# Patient Record
Sex: MALE | Race: BLACK OR AFRICAN AMERICAN | NOT HISPANIC OR LATINO | Employment: STUDENT | URBAN - METROPOLITAN AREA
[De-identification: names, ages, dates, MRNs, and addresses within clinical notes are randomized per-mention and may not be internally consistent; named-entity substitution may affect disease eponyms.]

---
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
## 2022-01-25 ENCOUNTER — OFFICE VISIT (OUTPATIENT)
Dept: PRIMARY CARE CLINIC | Facility: CLINIC | Age: 22
End: 2022-01-25
Payer: COMMERCIAL

## 2022-01-25 VITALS
HEIGHT: 69 IN | SYSTOLIC BLOOD PRESSURE: 126 MMHG | OXYGEN SATURATION: 98 % | WEIGHT: 167.31 LBS | RESPIRATION RATE: 16 BRPM | BODY MASS INDEX: 24.78 KG/M2 | TEMPERATURE: 98 F | HEART RATE: 93 BPM | DIASTOLIC BLOOD PRESSURE: 80 MMHG

## 2022-01-25 DIAGNOSIS — Z00.00 ANNUAL PHYSICAL EXAM: Primary | ICD-10-CM

## 2022-01-25 DIAGNOSIS — L20.82 FLEXURAL ECZEMA: ICD-10-CM

## 2022-01-25 DIAGNOSIS — Z77.21 EXPOSURE TO BLOOD OR BODY FLUID: ICD-10-CM

## 2022-01-25 DIAGNOSIS — Z91.010 ALLERGY TO PEANUTS: ICD-10-CM

## 2022-01-25 DIAGNOSIS — J45.20 MILD INTERMITTENT ASTHMA WITHOUT COMPLICATION: ICD-10-CM

## 2022-01-25 PROCEDURE — 3079F DIAST BP 80-89 MM HG: CPT | Mod: CPTII,S$GLB,, | Performed by: FAMILY MEDICINE

## 2022-01-25 PROCEDURE — 1159F MED LIST DOCD IN RCRD: CPT | Mod: CPTII,S$GLB,, | Performed by: FAMILY MEDICINE

## 2022-01-25 PROCEDURE — 3074F SYST BP LT 130 MM HG: CPT | Mod: CPTII,S$GLB,, | Performed by: FAMILY MEDICINE

## 2022-01-25 PROCEDURE — 3074F PR MOST RECENT SYSTOLIC BLOOD PRESSURE < 130 MM HG: ICD-10-PCS | Mod: CPTII,S$GLB,, | Performed by: FAMILY MEDICINE

## 2022-01-25 PROCEDURE — 87591 N.GONORRHOEAE DNA AMP PROB: CPT | Performed by: FAMILY MEDICINE

## 2022-01-25 PROCEDURE — 3079F PR MOST RECENT DIASTOLIC BLOOD PRESSURE 80-89 MM HG: ICD-10-PCS | Mod: CPTII,S$GLB,, | Performed by: FAMILY MEDICINE

## 2022-01-25 PROCEDURE — 87491 CHLMYD TRACH DNA AMP PROBE: CPT | Performed by: FAMILY MEDICINE

## 2022-01-25 PROCEDURE — 99385 PR PREVENTIVE VISIT,NEW,18-39: ICD-10-PCS | Mod: S$GLB,,, | Performed by: FAMILY MEDICINE

## 2022-01-25 PROCEDURE — 99999 PR PBB SHADOW E&M-EST. PATIENT-LVL V: ICD-10-PCS | Mod: PBBFAC,,, | Performed by: FAMILY MEDICINE

## 2022-01-25 PROCEDURE — 99999 PR PBB SHADOW E&M-EST. PATIENT-LVL V: CPT | Mod: PBBFAC,,, | Performed by: FAMILY MEDICINE

## 2022-01-25 PROCEDURE — 3008F PR BODY MASS INDEX (BMI) DOCUMENTED: ICD-10-PCS | Mod: CPTII,S$GLB,, | Performed by: FAMILY MEDICINE

## 2022-01-25 PROCEDURE — 1160F PR REVIEW ALL MEDS BY PRESCRIBER/CLIN PHARMACIST DOCUMENTED: ICD-10-PCS | Mod: CPTII,S$GLB,, | Performed by: FAMILY MEDICINE

## 2022-01-25 PROCEDURE — 3008F BODY MASS INDEX DOCD: CPT | Mod: CPTII,S$GLB,, | Performed by: FAMILY MEDICINE

## 2022-01-25 PROCEDURE — 1160F RVW MEDS BY RX/DR IN RCRD: CPT | Mod: CPTII,S$GLB,, | Performed by: FAMILY MEDICINE

## 2022-01-25 PROCEDURE — 1159F PR MEDICATION LIST DOCUMENTED IN MEDICAL RECORD: ICD-10-PCS | Mod: CPTII,S$GLB,, | Performed by: FAMILY MEDICINE

## 2022-01-25 PROCEDURE — 99385 PREV VISIT NEW AGE 18-39: CPT | Mod: S$GLB,,, | Performed by: FAMILY MEDICINE

## 2022-01-25 NOTE — PROGRESS NOTES
[facility] Note    Chief Complaint      Chief Complaint   Patient presents with    Establish Care       History of Present Illness      Rob Villanueva is a 21 y.o. male with chronic conditions of eczema, asthma who presents today for:    Interested in testing for STD - 1 week bumps on penis.  No other symptoms, no new partner  Wants Testing for allergies/asthma    Immunizations:   COVID- boosted 12/21   Flu- due   Pneumonia- due    Activity - plays basketball, school at [employer] - eggs and chicken breakfast.  Ponderosa and protein shake for lunch.  Meatballs, chicken and rice.   No soda, 8 bottles.     No tob  occ alcohol      Patient Care Team:  Linda Sanchez MD as PCP - General (Internal Medicine)     Health Maintenance:  Immunization History   Administered Date(s) Administered    COVID-19, MRNA, LN-S, PF (MODERNA FULL 0.5 ML DOSE) 03/20/2021, 04/17/2021    DTaP 2002, 2002, 01/06/2003, 12/18/2003, 08/15/2006    HIB 2002, 2002, 01/16/2003, 12/18/2003    HPV Quadrivalent 08/05/2013, 04/17/2014, 07/14/2015    Hepatitis A, Pediatric/Adolescent, 2 Dose 08/02/2016, 09/12/2017    Hepatitis B 2002    Hepatitis B, Pediatric/Adolescent 2002, 2002, 01/16/2003    IPV 2002, 2002, 12/18/2003, 08/15/2006    Influenza 10/08/2007, 09/24/2009, 11/02/2010    Influenza - Quadrivalent - PF *Preferred* (6 months and older) 01/25/2022    MMR 05/30/2003, 08/15/2006    Meningococcal B, OMV 06/08/2021, 07/02/2021    Meningococcal Conjugate 08/05/2013    Meningococcal Conjugate (MCV4P) 08/05/2013, 08/06/2018    PPD Test 05/17/2016    Pneumococcal Conjugate - 7 Valent 2002, 2002, 01/16/2003, 05/30/2003    Tdap 08/05/2013    Varicella 05/30/2003, 09/24/2009      Health Maintenance   Topic Date Due    Hepatitis C Screening  Never done    TETANUS VACCINE  08/05/2023    Lipid Panel  Completed    HPV Vaccines  Completed        Past  Medical History:  Past Medical History:   Diagnosis Date    Asthma     Eczema        Past Surgical History:   has no past surgical history on file.    Family History:  family history includes Allergies in his father; Asthma in his father; Cancer in his paternal grandfather; Diabetes in his paternal grandfather and paternal grandmother; Hearing loss in his father; Heart disease in his paternal grandfather; Hyperlipidemia in his paternal grandfather; Hypertension in his paternal grandfather; Obesity in his mother; Transient ischemic attack in his father.     Social History:  Social History     Tobacco Use    Smoking status: Never Smoker    Smokeless tobacco: Never Used   Substance Use Topics    Alcohol use: Yes     Comment: SPECIAL OCCASIONS    Drug use: Not Currently       Review of Systems   Constitutional: Negative for fever.   Respiratory: Negative for shortness of breath.    Cardiovascular: Negative for chest pain.   Gastrointestinal: Negative for change in bowel habit and change in bowel habit.   Genitourinary: Negative for bladder incontinence.        Medications:    Current Outpatient Medications:     albuterol (PROVENTIL/VENTOLIN HFA) 90 mcg/actuation inhaler, Rescue, Disp: 18 g, Rfl: 3    calcium carbonate-vitamin D3 600 mg(1,500mg) -800 unit Tab, Take 1 tablet by mouth 2 (two) times daily., Disp: 60 tablet, Rfl: 11    fluticasone (VERAMYST) 27.5 mcg/actuation nasal spray, 1 spray by Nasal route once daily., Disp: 10 g, Rfl: 11    hydrOXYzine HCl (ATARAX) 25 MG tablet, Take 1 tablet (25 mg total) by mouth nightly as needed for Itching., Disp: 60 tablet, Rfl: 3    inhalation device (AEROCHAMBER PLUS FLOW-VU), Use as directed for inhalation., Disp: 1 Device, Rfl: 0    mometasone 0.1% (ELOCON) 0.1 % cream, Apply to affected area daily, Disp: 50 g, Rfl: 1    ondansetron (ZOFRAN) 8 MG tablet, Take 1 tablet (8 mg total) by mouth every 8 (eight) hours as needed for Nausea., Disp: 4 tablet, Rfl: 0     "EPINEPHrine (EPIPEN) 0.3 mg/0.3 mL AtIn, Inject 0.3 mLs (0.3 mg total) into the muscle once. for 1 dose, Disp: 2 each, Rfl: 0     Allergies:  Review of patient's allergies indicates:   Allergen Reactions    Fish containing products     Peanut     Soy     Milk containing products Rash       Physical Exam      Vital Signs  Temp: 98.2 °F (36.8 °C)  Pulse: 93  Resp: 16  SpO2: 98 %  BP: 126/80  BP Location: Left arm  Pain Score: 0-No pain  Height and Weight  Height: 5' 8.5" (174 cm)  Weight: 75.9 kg (167 lb 5.3 oz)  BSA (Calculated - sq m): 1.92 sq meters  BMI (Calculated): 25.1  Weight in (lb) to have BMI = 25: 166.5             Physical Exam  Constitutional:       General: He is not in acute distress.     Appearance: Normal appearance.   HENT:      Right Ear: Tympanic membrane, ear canal and external ear normal.      Left Ear: Tympanic membrane, ear canal and external ear normal.      Nose: Nose normal.      Mouth/Throat:      Mouth: Mucous membranes are moist.      Pharynx: Oropharynx is clear. No oropharyngeal exudate or posterior oropharyngeal erythema.   Eyes:      Extraocular Movements: Extraocular movements intact.      Conjunctiva/sclera: Conjunctivae normal.      Pupils: Pupils are equal, round, and reactive to light.   Cardiovascular:      Rate and Rhythm: Normal rate and regular rhythm.      Pulses: Normal pulses.      Heart sounds: No murmur heard.  No friction rub. No gallop.    Pulmonary:      Effort: Pulmonary effort is normal.      Breath sounds: No wheezing, rhonchi or rales.   Abdominal:      General: Abdomen is flat. Bowel sounds are normal. There is no distension.      Palpations: Abdomen is soft. There is no mass.      Tenderness: There is no abdominal tenderness.   Musculoskeletal:      Cervical back: Neck supple.      Right lower leg: No edema.      Left lower leg: No edema.   Lymphadenopathy:      Cervical: No cervical adenopathy.   Skin:     General: Skin is warm and dry.      Comments: Very " fine papule around corona, no erythema, no ulcer   Neurological:      General: No focal deficit present.      Mental Status: He is alert.   Psychiatric:         Mood and Affect: Mood normal.         Behavior: Behavior normal.          Laboratory:  CBC:  Recent Labs   Lab 08/10/20  0909   Hemoglobin 17.2       CMP:            URINALYSIS:         LIPIDS:  Recent Labs   Lab 08/10/20  0909   HDL 49   Cholesterol 185   Triglycerides 68   LDL Cholesterol 122.4   HDL/Cholesterol Ratio 26.5   Non-HDL Cholesterol 136   Total Cholesterol/HDL Ratio 3.8       TSH:        A1C:        Urine Microalbumin/Cr:          Other:   Recent Labs   Lab 08/10/20  0909   Vit D, 25-Hydroxy 13 L           Assessment/Plan     Rob Villanueva is a 21 y.o.male with:    Annual physical exam  -     CBC Auto Differential; Future; Expected date: 01/25/2022  -     Comprehensive Metabolic Panel; Future; Expected date: 01/25/2022  -     Lipid Panel; Future; Expected date: 01/25/2022  -     HIV 1/2 Ag/Ab (4th Gen); Future; Expected date: 01/25/2022  Physical activity, diet, healthy lifestyle, alcohol, tobacco, screenings and immunizations discussed.    Mild intermittent asthma without complication  -     Complete PFT with bronchodilator; Future  -     Hemoglobin; Future; Expected date: 01/25/2022  -     Ambulatory referral/consult to Allergy; Future; Expected date: 02/01/2022  - stable, continue current medication  Get PFT to stage him as he would like dupixent  Exposure to blood or body fluid  -     RPR; Future; Expected date: 01/25/2022  -     HSV 1 & 2, IgG; Future; Expected date: 01/25/2022  -     Hepatitis Panel, Acute; Future; Expected date: 01/25/2022  -     C. trachomatis/N. gonorrhoeae by AMP DNA [facility]; Urine    Allergy to peanuts  -     Ambulatory referral/consult to Allergy; Future; Expected date: 02/01/2022    Flexural eczema  -     Ambulatory referral/consult to Allergy; Future; Expected date: 02/01/2022  Use aquaphor on penile rash        Chronic conditions status updated as per HPI.  Other than changes above, cont current medications and maintain follow up with specialists.  Return to clinic in Follow up in about 1 year (around 1/25/2023).      Linda Sanchez MD  [facility]

## 2022-01-27 LAB
C TRACH DNA SPEC QL NAA+PROBE: NOT DETECTED
N GONORRHOEA DNA SPEC QL NAA+PROBE: NOT DETECTED

## 2022-01-31 ENCOUNTER — OFFICE VISIT (OUTPATIENT)
Dept: INTERNAL MEDICINE | Facility: CLINIC | Age: 22
End: 2022-01-31
Payer: COMMERCIAL

## 2022-01-31 ENCOUNTER — HOSPITAL ENCOUNTER (OUTPATIENT)
Dept: CARDIOLOGY | Facility: HOSPITAL | Age: 22
Discharge: HOME | End: 2022-01-31
Payer: COMMERCIAL

## 2022-01-31 ENCOUNTER — LAB VISIT (OUTPATIENT)
Dept: LAB | Facility: HOSPITAL | Age: 22
End: 2022-01-31
Attending: ALLERGY & IMMUNOLOGY
Payer: COMMERCIAL

## 2022-01-31 ENCOUNTER — PATIENT MESSAGE (OUTPATIENT)
Dept: INTERNAL MEDICINE | Facility: CLINIC | Age: 22
End: 2022-01-31

## 2022-01-31 ENCOUNTER — OFFICE VISIT (OUTPATIENT)
Dept: ALLERGY | Facility: CLINIC | Age: 22
End: 2022-01-31
Payer: COMMERCIAL

## 2022-01-31 VITALS
BODY MASS INDEX: 25.73 KG/M2 | SYSTOLIC BLOOD PRESSURE: 136 MMHG | WEIGHT: 169.75 LBS | TEMPERATURE: 99 F | HEIGHT: 68 IN | HEART RATE: 72 BPM | DIASTOLIC BLOOD PRESSURE: 76 MMHG

## 2022-01-31 VITALS
RESPIRATION RATE: 18 BRPM | HEART RATE: 80 BPM | OXYGEN SATURATION: 98 % | HEIGHT: 68 IN | BODY MASS INDEX: 25.81 KG/M2 | SYSTOLIC BLOOD PRESSURE: 110 MMHG | DIASTOLIC BLOOD PRESSURE: 60 MMHG

## 2022-01-31 DIAGNOSIS — Z77.21 EXPOSURE TO BLOOD OR BODY FLUID: ICD-10-CM

## 2022-01-31 DIAGNOSIS — J30.1 SEASONAL ALLERGIC RHINITIS DUE TO POLLEN: ICD-10-CM

## 2022-01-31 DIAGNOSIS — Z91.018 FOOD ALLERGY: ICD-10-CM

## 2022-01-31 DIAGNOSIS — Z91.010 ALLERGY TO PEANUTS: ICD-10-CM

## 2022-01-31 DIAGNOSIS — Z00.00 ANNUAL PHYSICAL EXAM: ICD-10-CM

## 2022-01-31 DIAGNOSIS — Z91.018 FOOD ALLERGY: Primary | ICD-10-CM

## 2022-01-31 DIAGNOSIS — L20.82 FLEXURAL ECZEMA: ICD-10-CM

## 2022-01-31 DIAGNOSIS — F41.0 PANIC ATTACK: ICD-10-CM

## 2022-01-31 DIAGNOSIS — I49.9 IRREGULAR HEART BEAT: Primary | ICD-10-CM

## 2022-01-31 DIAGNOSIS — J45.20 MILD INTERMITTENT ASTHMA WITHOUT COMPLICATION: ICD-10-CM

## 2022-01-31 DIAGNOSIS — U07.1 COVID: ICD-10-CM

## 2022-01-31 DIAGNOSIS — R07.9 CHEST PAIN, UNSPECIFIED TYPE: ICD-10-CM

## 2022-01-31 DIAGNOSIS — I49.9 IRREGULAR HEART BEAT: ICD-10-CM

## 2022-01-31 LAB
ALBUMIN SERPL BCP-MCNC: 4.8 G/DL (ref 3.5–5.2)
ALP SERPL-CCNC: 65 U/L (ref 55–135)
ALT SERPL W/O P-5'-P-CCNC: 26 U/L (ref 10–44)
ANION GAP SERPL CALC-SCNC: 12 MMOL/L (ref 8–16)
AST SERPL-CCNC: 20 U/L (ref 10–40)
BASOPHILS # BLD AUTO: 0.04 K/UL (ref 0–0.2)
BASOPHILS NFR BLD: 0.6 % (ref 0–1.9)
BILIRUB SERPL-MCNC: 0.7 MG/DL (ref 0.1–1)
BUN SERPL-MCNC: 13 MG/DL (ref 6–20)
CALCIUM SERPL-MCNC: 10.1 MG/DL (ref 8.7–10.5)
CHLORIDE SERPL-SCNC: 104 MMOL/L (ref 95–110)
CHOLEST SERPL-MCNC: 197 MG/DL (ref 120–199)
CHOLEST/HDLC SERPL: 3.9 {RATIO} (ref 2–5)
CO2 SERPL-SCNC: 24 MMOL/L (ref 23–29)
CREAT SERPL-MCNC: 0.9 MG/DL (ref 0.5–1.4)
CTP QC/QA: YES
DIFFERENTIAL METHOD: ABNORMAL
EOSINOPHIL # BLD AUTO: 0.2 K/UL (ref 0–0.5)
EOSINOPHIL NFR BLD: 2.4 % (ref 0–8)
ERYTHROCYTE [DISTWIDTH] IN BLOOD BY AUTOMATED COUNT: 12.8 % (ref 11.5–14.5)
EST. GFR  (AFRICAN AMERICAN): >60 ML/MIN/1.73 M^2
EST. GFR  (NON AFRICAN AMERICAN): >60 ML/MIN/1.73 M^2
GLUCOSE SERPL-MCNC: 81 MG/DL (ref 70–110)
HCT VFR BLD AUTO: 54.4 % (ref 40–54)
HDLC SERPL-MCNC: 50 MG/DL (ref 40–75)
HDLC SERPL: 25.4 % (ref 20–50)
HGB BLD-MCNC: 17.4 G/DL (ref 14–18)
HGB BLD-MCNC: 17.4 G/DL (ref 14–18)
IMM GRANULOCYTES # BLD AUTO: 0.01 K/UL (ref 0–0.04)
IMM GRANULOCYTES NFR BLD AUTO: 0.1 % (ref 0–0.5)
LDLC SERPL CALC-MCNC: 136.2 MG/DL (ref 63–159)
LYMPHOCYTES # BLD AUTO: 2.1 K/UL (ref 1–4.8)
LYMPHOCYTES NFR BLD: 30 % (ref 18–48)
MCH RBC QN AUTO: 30.5 PG (ref 27–31)
MCHC RBC AUTO-ENTMCNC: 32 G/DL (ref 32–36)
MCV RBC AUTO: 95 FL (ref 82–98)
MONOCYTES # BLD AUTO: 0.4 K/UL (ref 0.3–1)
MONOCYTES NFR BLD: 5.4 % (ref 4–15)
NEUTROPHILS # BLD AUTO: 4.4 K/UL (ref 1.8–7.7)
NEUTROPHILS NFR BLD: 61.5 % (ref 38–73)
NONHDLC SERPL-MCNC: 147 MG/DL
NRBC BLD-RTO: 0 /100 WBC
PLATELET # BLD AUTO: 225 K/UL (ref 150–450)
PMV BLD AUTO: 10.8 FL (ref 9.2–12.9)
POTASSIUM SERPL-SCNC: 4 MMOL/L (ref 3.5–5.1)
PROT SERPL-MCNC: 8.6 G/DL (ref 6–8.4)
RBC # BLD AUTO: 5.71 M/UL (ref 4.6–6.2)
SARS-COV-2 RDRP RESP QL NAA+PROBE: POSITIVE
SODIUM SERPL-SCNC: 140 MMOL/L (ref 136–145)
TRIGL SERPL-MCNC: 54 MG/DL (ref 30–150)
WBC # BLD AUTO: 7.1 K/UL (ref 3.9–12.7)

## 2022-01-31 PROCEDURE — 86695 HERPES SIMPLEX TYPE 1 TEST: CPT | Performed by: FAMILY MEDICINE

## 2022-01-31 PROCEDURE — 99213 PR OFFICE/OUTPT VISIT, EST, LEVL III, 20-29 MIN: ICD-10-PCS | Mod: S$GLB,,, | Performed by: PHYSICIAN ASSISTANT

## 2022-01-31 PROCEDURE — 80053 COMPREHEN METABOLIC PANEL: CPT | Performed by: FAMILY MEDICINE

## 2022-01-31 PROCEDURE — 3008F PR BODY MASS INDEX (BMI) DOCUMENTED: ICD-10-PCS | Mod: CPTII,S$GLB,, | Performed by: PHYSICIAN ASSISTANT

## 2022-01-31 PROCEDURE — 99213 OFFICE O/P EST LOW 20 MIN: CPT | Mod: S$GLB,,, | Performed by: PHYSICIAN ASSISTANT

## 2022-01-31 PROCEDURE — U0002: ICD-10-PCS | Mod: QW,S$GLB,, | Performed by: PHYSICIAN ASSISTANT

## 2022-01-31 PROCEDURE — 80074 ACUTE HEPATITIS PANEL: CPT | Performed by: FAMILY MEDICINE

## 2022-01-31 PROCEDURE — 3078F PR MOST RECENT DIASTOLIC BLOOD PRESSURE < 80 MM HG: ICD-10-PCS | Mod: CPTII,S$GLB,, | Performed by: ALLERGY & IMMUNOLOGY

## 2022-01-31 PROCEDURE — 87389 HIV-1 AG W/HIV-1&-2 AB AG IA: CPT | Performed by: FAMILY MEDICINE

## 2022-01-31 PROCEDURE — 3078F DIAST BP <80 MM HG: CPT | Mod: CPTII,S$GLB,, | Performed by: ALLERGY & IMMUNOLOGY

## 2022-01-31 PROCEDURE — 99204 PR OFFICE/OUTPT VISIT, NEW, LEVL IV, 45-59 MIN: ICD-10-PCS | Mod: S$GLB,,, | Performed by: ALLERGY & IMMUNOLOGY

## 2022-01-31 PROCEDURE — 99999 PR PBB SHADOW E&M-EST. PATIENT-LVL IV: CPT | Mod: PBBFAC,,, | Performed by: ALLERGY & IMMUNOLOGY

## 2022-01-31 PROCEDURE — 80061 LIPID PANEL: CPT | Performed by: FAMILY MEDICINE

## 2022-01-31 PROCEDURE — 93010 ELECTROCARDIOGRAM REPORT: CPT | Mod: ,,, | Performed by: INTERNAL MEDICINE

## 2022-01-31 PROCEDURE — 86696 HERPES SIMPLEX TYPE 2 TEST: CPT | Performed by: FAMILY MEDICINE

## 2022-01-31 PROCEDURE — 86003 ALLG SPEC IGE CRUDE XTRC EA: CPT | Performed by: ALLERGY & IMMUNOLOGY

## 2022-01-31 PROCEDURE — 86008 ALLG SPEC IGE RECOMB EA: CPT | Mod: 59 | Performed by: ALLERGY & IMMUNOLOGY

## 2022-01-31 PROCEDURE — 99999 PR PBB SHADOW E&M-EST. PATIENT-LVL III: ICD-10-PCS | Mod: PBBFAC,,, | Performed by: PHYSICIAN ASSISTANT

## 2022-01-31 PROCEDURE — 3074F PR MOST RECENT SYSTOLIC BLOOD PRESSURE < 130 MM HG: ICD-10-PCS | Mod: CPTII,S$GLB,, | Performed by: PHYSICIAN ASSISTANT

## 2022-01-31 PROCEDURE — 1159F MED LIST DOCD IN RCRD: CPT | Mod: CPTII,S$GLB,, | Performed by: PHYSICIAN ASSISTANT

## 2022-01-31 PROCEDURE — 93010 EKG 12-LEAD: ICD-10-PCS | Mod: ,,, | Performed by: INTERNAL MEDICINE

## 2022-01-31 PROCEDURE — 86003 ALLG SPEC IGE CRUDE XTRC EA: CPT | Mod: 59 | Performed by: ALLERGY & IMMUNOLOGY

## 2022-01-31 PROCEDURE — 85025 COMPLETE CBC W/AUTO DIFF WBC: CPT | Performed by: FAMILY MEDICINE

## 2022-01-31 PROCEDURE — 86592 SYPHILIS TEST NON-TREP QUAL: CPT | Performed by: FAMILY MEDICINE

## 2022-01-31 PROCEDURE — 36415 COLL VENOUS BLD VENIPUNCTURE: CPT | Performed by: FAMILY MEDICINE

## 2022-01-31 PROCEDURE — 3008F BODY MASS INDEX DOCD: CPT | Mod: CPTII,S$GLB,, | Performed by: ALLERGY & IMMUNOLOGY

## 2022-01-31 PROCEDURE — 3078F PR MOST RECENT DIASTOLIC BLOOD PRESSURE < 80 MM HG: ICD-10-PCS | Mod: CPTII,S$GLB,, | Performed by: PHYSICIAN ASSISTANT

## 2022-01-31 PROCEDURE — 3008F PR BODY MASS INDEX (BMI) DOCUMENTED: ICD-10-PCS | Mod: CPTII,S$GLB,, | Performed by: ALLERGY & IMMUNOLOGY

## 2022-01-31 PROCEDURE — 1159F PR MEDICATION LIST DOCUMENTED IN MEDICAL RECORD: ICD-10-PCS | Mod: CPTII,S$GLB,, | Performed by: PHYSICIAN ASSISTANT

## 2022-01-31 PROCEDURE — 3075F PR MOST RECENT SYSTOLIC BLOOD PRESS GE 130-139MM HG: ICD-10-PCS | Mod: CPTII,S$GLB,, | Performed by: ALLERGY & IMMUNOLOGY

## 2022-01-31 PROCEDURE — 99204 OFFICE O/P NEW MOD 45 MIN: CPT | Mod: S$GLB,,, | Performed by: ALLERGY & IMMUNOLOGY

## 2022-01-31 PROCEDURE — 3075F SYST BP GE 130 - 139MM HG: CPT | Mod: CPTII,S$GLB,, | Performed by: ALLERGY & IMMUNOLOGY

## 2022-01-31 PROCEDURE — 99999 PR PBB SHADOW E&M-EST. PATIENT-LVL III: CPT | Mod: PBBFAC,,, | Performed by: PHYSICIAN ASSISTANT

## 2022-01-31 PROCEDURE — 99999 PR PBB SHADOW E&M-EST. PATIENT-LVL IV: ICD-10-PCS | Mod: PBBFAC,,, | Performed by: ALLERGY & IMMUNOLOGY

## 2022-01-31 PROCEDURE — 3078F DIAST BP <80 MM HG: CPT | Mod: CPTII,S$GLB,, | Performed by: PHYSICIAN ASSISTANT

## 2022-01-31 PROCEDURE — 3074F SYST BP LT 130 MM HG: CPT | Mod: CPTII,S$GLB,, | Performed by: PHYSICIAN ASSISTANT

## 2022-01-31 PROCEDURE — U0002 COVID-19 LAB TEST NON-CDC: HCPCS | Mod: QW,S$GLB,, | Performed by: PHYSICIAN ASSISTANT

## 2022-01-31 PROCEDURE — 3008F BODY MASS INDEX DOCD: CPT | Mod: CPTII,S$GLB,, | Performed by: PHYSICIAN ASSISTANT

## 2022-01-31 PROCEDURE — 93005 ELECTROCARDIOGRAM TRACING: CPT

## 2022-01-31 RX ORDER — MONTELUKAST SODIUM 10 MG/1
10 TABLET ORAL NIGHTLY
Qty: 30 TABLET | Refills: 0 | Status: SHIPPED | OUTPATIENT
Start: 2022-01-31 | End: 2022-01-31

## 2022-01-31 RX ORDER — FLUTICASONE FUROATE, UMECLIDINIUM BROMIDE AND VILANTEROL TRIFENATATE 200; 62.5; 25 UG/1; UG/1; UG/1
1 POWDER RESPIRATORY (INHALATION) DAILY
Qty: 1 EACH | Refills: 3 | Status: SHIPPED | OUTPATIENT
Start: 2022-01-31 | End: 2023-01-09

## 2022-01-31 NOTE — PROGRESS NOTES
Subjective:      Patient ID: Rob Villanueva is a 21 y.o. male.    Chief Complaint: Irregular Heart Beat    HPI  Here today for a follow up after he had a rapid response after a blood draw about 30 minutes ago.   After blood draw today developed nausea and vomiting. Then developed shortness of breath and tightness in his chest after standing up. Symptoms started 30-40 minutes ago and have resolved. Feels 90% improved. Still feeling a slight tightness in the chest. Denies any lightheadedness/dizziness, shortness of breath, or palpitations.   Rapid response was called. Initially heart rate was low at 46, bp 110/60 and oxygen 93. Pt currently at 99% O2 sat and pulse 80 but still irregular. Has not had issues with irregular heart beat in the past. Pt states that before the blood draw he felt 100% without any issues, no chest pain/tightness or shortness of breath. He does have rhinorrhea but is seeing allergist for this. He was completing blood work this morning ordered by his allergist.   Hates getting his blood drawn and gets anxiety with it. Hasn't had an episode like this though after a blood draw in the past.    Denies taking any over the counter supplements or stimulants. Denies drinking excess caffeine this morning.     -pt able to ambulate without assistance. Normal coordination and gait. No dizziness/lightheadedness or changes in vision.   Patient Active Problem List   Diagnosis    Eczema    Allergy to peanuts    Asthma, mild intermittent    Allergic rhinoconjunctivitis    Vitamin D insufficiency       Current Outpatient Medications:     albuterol (PROVENTIL/VENTOLIN HFA) 90 mcg/actuation inhaler, Rescue, Disp: 18 g, Rfl: 3    fluticasone (VERAMYST) 27.5 mcg/actuation nasal spray, 1 spray by Nasal route once daily., Disp: 10 g, Rfl: 11    fluticasone-umeclidin-vilanter (TRELEGY ELLIPTA) 200-62.5-25 mcg inhaler, Inhale 1 puff into the lungs once daily., Disp: 1 each, Rfl: 3    EPINEPHrine (EPIPEN) 0.3  "mg/0.3 mL AtIn, Inject 0.3 mLs (0.3 mg total) into the muscle once. for 1 dose, Disp: 2 each, Rfl: 0    montelukast (SINGULAIR) 10 mg tablet, TAKE 1 TABLET(10 MG) BY MOUTH EVERY EVENING, Disp: 90 tablet, Rfl: 1      Review of Systems   Constitutional: Negative for activity change, appetite change, chills, diaphoresis, fatigue, fever and unexpected weight change.   HENT: Positive for rhinorrhea. Negative for congestion, hearing loss, postnasal drip, sore throat, trouble swallowing and voice change.    Eyes: Negative.  Negative for visual disturbance.   Respiratory: Positive for chest tightness. Negative for apnea, cough, choking, shortness of breath, wheezing and stridor.    Cardiovascular: Negative for chest pain, palpitations and leg swelling.   Gastrointestinal: Negative for abdominal distention, abdominal pain, blood in stool, constipation, diarrhea, nausea and vomiting.   Endocrine: Negative for cold intolerance, heat intolerance, polydipsia and polyuria.   Genitourinary: Negative.  Negative for difficulty urinating and frequency.   Musculoskeletal: Negative for arthralgias, back pain, gait problem, joint swelling and myalgias.   Skin: Negative for color change, pallor, rash and wound.   Neurological: Negative for dizziness, tremors, weakness, light-headedness, numbness and headaches.   Hematological: Negative for adenopathy.   Psychiatric/Behavioral: Negative for agitation, behavioral problems, confusion, self-injury, sleep disturbance and suicidal ideas. The patient is not nervous/anxious.      Objective:   /60 (BP Location: Left arm, Patient Position: Sitting, BP Method: Medium (Manual))   Pulse 80   Resp 18   Ht 5' 8" (1.727 m)   SpO2 98%   BMI 25.81 kg/m²     Physical Exam  Vitals reviewed.   Constitutional:       General: He is not in acute distress.     Appearance: Normal appearance. He is well-developed and well-nourished. He is not ill-appearing, toxic-appearing or diaphoretic.   HENT:      " Head: Normocephalic and atraumatic.      Right Ear: External ear normal.      Left Ear: External ear normal.      Nose: Nose normal.      Mouth/Throat:      Mouth: Oropharynx is clear and moist.   Eyes:      Extraocular Movements: EOM normal.      Conjunctiva/sclera: Conjunctivae normal.      Pupils: Pupils are equal, round, and reactive to light.   Cardiovascular:      Rate and Rhythm: Normal rate. Rhythm irregular.      Pulses: Normal pulses.      Heart sounds: Normal heart sounds. No murmur heard.  No friction rub. No gallop.    Pulmonary:      Effort: Pulmonary effort is normal. No respiratory distress.      Breath sounds: Normal breath sounds. No wheezing or rales.   Chest:      Chest wall: No tenderness.   Abdominal:      General: There is no distension.      Palpations: Abdomen is soft.      Tenderness: There is no abdominal tenderness.   Musculoskeletal:         General: Normal range of motion.      Cervical back: Normal range of motion and neck supple.   Lymphadenopathy:      Cervical: No cervical adenopathy.   Skin:     General: Skin is warm and dry.   Neurological:      General: No focal deficit present.      Mental Status: He is alert and oriented to person, place, and time.      Motor: No weakness.      Coordination: Coordination normal.      Gait: Gait normal.   Psychiatric:         Mood and Affect: Mood and affect and mood normal.         Behavior: Behavior normal.         Thought Content: Thought content normal.         Judgment: Judgment normal.       Office Visit on 01/31/2022   Component Date Value Ref Range Status    POC Rapid COVID 01/31/2022 Positive* Negative Final     Acceptable 01/31/2022 Yes   Final       Assessment:     1. Irregular heart beat    2. Chest pain, unspecified type    3. Panic attack    4. COVID      Plan:   Irregular heart beat  -     EKG 12-lead; Future  -     POCT COVID-19 Rapid Screening    Chest pain, unspecified type  -     POCT COVID-19 Rapid  Screening    Panic attack    COVID  -     Ambulatory referral/consult to [facility]; Future; Expected date: 02/01/2022  -     COVID-19 Home Symptom Monitoring  - Duration (days): 14    -symptoms of panic attack have resolved. Persistent irregular heart rhythm. Will further eval with ekg today. Rate is good.     Follow up if symptoms worsen or fail to improve.

## 2022-01-31 NOTE — PROGRESS NOTES
"Subjective:       Patient ID: Rob Villanueva is a 21 y.o. male.    Referred by Linda Sanchez MD    Chief Complaint:  Asthma and Allergic Rhinitis       HPI: 21 year old male " I was here to get an allergy test, to see if I am still allergic to peanuts."  Peanut- throat closure per " my parents".  Vance milk- itchy throat  Avoids peanuts and tree nuts.  Epipen  Fish and soy- he does not know what reaction he has  Milk- bloating    Asthma-diagnosed as a child  He does not remember taking oral steroids.  He as hospitalized at 7 years of age for asthma.  Uses albuterol prior to exercise  Asthma triggers- exercise, smoke  NO emergency room or Urgent Care visits for asthma over the last 12 months  He reports mild shortness of breath with exercise despite using his albuterol inhaler.    Runny nose in the Fall  Claritin daily  No history of allergy testing    Atopic dermatitis  antecubital fossa   acqauphor  No steroid creams  Soap- Aveeno      He eats shrimp, lobster and crab without symptoms.    Past Medical History:   Diagnosis Date    Asthma     Eczema        Family History   Problem Relation Age of Onset    Asthma Father     Allergies Father     Hearing loss Father     Transient ischemic attack Father     Diabetes Paternal Grandmother     Cancer Paternal Grandfather     Diabetes Paternal Grandfather     Heart disease Paternal Grandfather     Hyperlipidemia Paternal Grandfather     Hypertension Paternal Grandfather     Obesity Mother     Early death Neg Hx     Eczema Neg Hx     Migraines Neg Hx     Thyroid disease Neg Hx        Current Outpatient Medications on File Prior to Visit   Medication Sig Dispense Refill    albuterol (PROVENTIL/VENTOLIN HFA) 90 mcg/actuation inhaler Rescue 18 g 3    fluticasone (VERAMYST) 27.5 mcg/actuation nasal spray 1 spray by Nasal route once daily. 10 g 11    EPINEPHrine (EPIPEN) 0.3 mg/0.3 mL AtIn Inject 0.3 mLs (0.3 mg total) into the muscle once. for 1 dose 2 each 0 "    [DISCONTINUED] calcium carbonate-vitamin D3 600 mg(1,500mg) -800 unit Tab Take 1 tablet by mouth 2 (two) times daily. (Patient not taking: Reported on 1/31/2022) 60 tablet 11    [DISCONTINUED] hydrOXYzine HCl (ATARAX) 25 MG tablet Take 1 tablet (25 mg total) by mouth nightly as needed for Itching. (Patient not taking: Reported on 1/31/2022) 60 tablet 3    [DISCONTINUED] inhalation device (AEROCHAMBER PLUS FLOW-VU) Use as directed for inhalation. (Patient not taking: Reported on 1/31/2022) 1 Device 0    [DISCONTINUED] mometasone 0.1% (ELOCON) 0.1 % cream Apply to affected area daily (Patient not taking: Reported on 1/31/2022) 50 g 1    [DISCONTINUED] ondansetron (ZOFRAN) 8 MG tablet Take 1 tablet (8 mg total) by mouth every 8 (eight) hours as needed for Nausea. (Patient not taking: Reported on 1/31/2022) 4 tablet 0     No current facility-administered medications on file prior to visit.       Review of patient's allergies indicates:   Allergen Reactions    Fish containing products     Peanut     Soy     Milk containing products Rash       Environmental History: Pets in the home: dogs (1). He does not smoke.  Review of Systems   Constitutional: Negative for chills and fever.   HENT: Positive for congestion and rhinorrhea.    Eyes: Negative for discharge and itching.   Respiratory: Negative for cough, shortness of breath and wheezing.    Cardiovascular: Negative for chest pain and leg swelling.   Gastrointestinal: Negative for nausea and vomiting.   Endocrine: Negative for cold intolerance and heat intolerance.   Skin: Positive for rash. Negative for wound.   Allergic/Immunologic: Positive for environmental allergies and food allergies.   Neurological: Negative for facial asymmetry and speech difficulty.   Hematological: Negative for adenopathy. Does not bruise/bleed easily.   Psychiatric/Behavioral: Negative for behavioral problems and suicidal ideas.        Objective:    Physical Exam  Vitals reviewed.    Constitutional:       General: He is not in acute distress.     Appearance: Normal appearance. He is not ill-appearing, toxic-appearing or diaphoretic.   HENT:      Head: Normocephalic and atraumatic.      Right Ear: Tympanic membrane, ear canal and external ear normal. There is no impacted cerumen.      Left Ear: Tympanic membrane, ear canal and external ear normal. There is no impacted cerumen.      Nose: Nose normal. No congestion or rhinorrhea.      Mouth/Throat:      Mouth: Mucous membranes are moist.      Pharynx: No oropharyngeal exudate or posterior oropharyngeal erythema.   Eyes:      General: No scleral icterus.        Right eye: No discharge.         Left eye: No discharge.      Pupils: Pupils are equal, round, and reactive to light.   Neck:      Vascular: No carotid bruit.   Cardiovascular:      Rate and Rhythm: Normal rate and regular rhythm.      Heart sounds: Normal heart sounds. No murmur heard.  No friction rub. No gallop.    Pulmonary:      Effort: Pulmonary effort is normal. No respiratory distress.      Breath sounds: Normal breath sounds. No stridor. No wheezing, rhonchi or rales.   Chest:      Chest wall: No tenderness.   Abdominal:      General: There is no distension.      Palpations: There is no mass.      Tenderness: There is no abdominal tenderness. There is no guarding or rebound.      Hernia: No hernia is present.   Musculoskeletal:         General: No swelling, tenderness, deformity or signs of injury. Normal range of motion.      Cervical back: Normal range of motion and neck supple. No rigidity or tenderness.      Right lower leg: No edema.      Left lower leg: No edema.   Lymphadenopathy:      Cervical: No cervical adenopathy.   Skin:     General: Skin is warm.      Coloration: Skin is not jaundiced or pale.      Findings: No bruising, erythema or lesion.   Neurological:      General: No focal deficit present.      Mental Status: He is alert and oriented to person, place, and time.       Cranial Nerves: No cranial nerve deficit.      Motor: No weakness.      Gait: Gait normal.   Psychiatric:         Mood and Affect: Mood normal.         Behavior: Behavior normal.         Thought Content: Thought content normal.         Judgment: Judgment normal.          Unable to skin prick test, as he is taking oral antihistamines  Assessment:       1. Food allergy    2. Mild intermittent asthma without complication    3. Allergy to peanuts    4. Flexural eczema    5. Seasonal allergic rhinitis due to pollen         Plan:         Food allergy  -     Allergen, Peanut Components IGE; Future; Expected date: 01/31/2022  -     Soybean IgE; Future; Expected date: 01/31/2022  -     Almonds IgE; Future; Expected date: 01/31/2022  -     ALLERGEN - PISTACHIO; Future; Expected date: 01/31/2022  -     Cashew IgE; Future; Expected date: 01/31/2022  -     Pecan, nut; Future; Expected date: 01/31/2022  -     [place] IgE; Future; Expected date: 01/31/2022  -     Hazelnut IgE; Future; Expected date: 01/31/2022  -     ALLERGEN-CATFISH; Future; Expected date: 01/31/2022  -     Tuna IgE; Future; Expected date: 01/31/2022  -     [place] IgE; Future; Expected date: 01/31/2022    Mild intermittent asthma without complication  -     Ambulatory referral/consult to Allergy  -     fluticasone-umeclidin-vilanter (TRELEGY ELLIPTA) 200-62.5-25 mcg inhaler; Inhale 1 puff into the lungs once daily.  Dispense: 1 each; Refill: 3  -     montelukast (SINGULAIR) 10 mg tablet; Take 1 tablet (10 mg total) by mouth every evening.  Dispense: 30 tablet; Refill: 0    Allergy to peanuts  -     Ambulatory referral/consult to Allergy  -     Allergen, Peanut Components IGE; Future; Expected date: 01/31/2022    Flexural eczema  -     Ambulatory referral/consult to Allergy    Seasonal allergic rhinitis due to pollen      Discussed dupixent. Will try trelegy first.  Recommend continued avoidance of foods that cause symptoms.  Epipen 2 pack  RTC 4-6 weeks or  sooner, if needed    ALEX DELVALLE                    Problems Address                                                 Amount and/or Complexity                                                                      Risk         4            [] One or more chronic illness with exacerbation,              [x] Moderate                                                                      [x] Moderate                 Progression, or side effects of treatment                            -test documents or independent historians                        Prescription drug management                []  2 or more sable chronic illnesses                                    [] Independent interpretation of tests                              Minor surgery with identifiable risk                [] 1 undiagnosed new problem with uncertain prognosis    [] Discussion or management of test results                    elective major surgery                [] 1 acute illness with                systemic symptoms                                                                                                                                                              [] 1 acute complicated injury                                                                                                                                          Elective major surgery                                                                                                                                                                                                                                                                                                                                                                                                  5            [] 1 or more chronic illnesses with severe exacerbation,     [] Extensive(two from below)                                         [] High                                                                                                                [] Independent interpretation of results                         Drug therapy requiring intensive                                                                                                               []Discussion of management or test interpretation           monitoring                                                                                                                                                                                                       Decision to de-escalate care                 [] 1 acute or chronic illness or injury that poses a threat                                                                                               Decision regarding hospitalization                                                                                                                                                                                                                CC: Dr. Sanchez

## 2022-01-31 NOTE — PATIENT INSTRUCTIONS
Instructions for Patients with Confirmed or Suspected COVID-19    If you are awaiting your test result, you will either be called or it will be released to the patient portal.  If you have any questions about your test, please visit [email or web address] or call our COVID-19 information line at [phone].      Please isolate yourself at home.  You may leave home and/or return to work once the following conditions are met:    If you have symptoms and tested positive:   More than 5 days since symptoms first appeared AND   More than 24 hours fever free without medications AND       symptoms have improved   · For five days after ending isolation, masks are required.    If you had no symptoms but tested positive:   More than 5 days since the date of the first positive test. If you develop symptoms, then use the guidelines above  · For five days after ending isolation, masks are required.      Testing is not recommended if you are symptom free after completing isolation.Instructions for Patients with Confirmed or Suspected COVID-19    If you are awaiting your test result, you will either be called or it will be released to the patient portal.  If you have any questions about your test, please visit [email or web address] or call our COVID-19 information line at [phone].      Please isolate yourself at home.  You may leave home and/or return to work once the following conditions are met:    If you have symptoms and tested positive:   More than 5 days since symptoms first appeared AND   More than 24 hours fever free without medications AND       symptoms have improved   · For five days after ending isolation, masks are required.    If you had no symptoms but tested positive:   More than 5 days since the date of the first positive test. If you develop symptoms, then use the guidelines above  · For five days after ending isolation, masks are required.      Testing is not recommended if you are  symptom free after completing isolation.

## 2022-02-01 ENCOUNTER — TELEPHONE (OUTPATIENT)
Dept: INTERNAL MEDICINE | Facility: CLINIC | Age: 22
End: 2022-02-01
Payer: COMMERCIAL

## 2022-02-01 LAB — RPR SER QL: NORMAL

## 2022-02-01 NOTE — TELEPHONE ENCOUNTER
----- Message from Cassandra Maguire sent at 2/1/2022 10:47 AM CST -----  Contact: Patient  Patient called to consult with nurse or staff regarding his test results. He states he missed a call and would like a call back. Patient can be reached at [phone]. Thanks/

## 2022-02-02 LAB
HAV IGM SERPL QL IA: NEGATIVE
HBV CORE IGM SERPL QL IA: NEGATIVE
HBV SURFACE AG SERPL QL IA: NEGATIVE
HCV AB SERPL QL IA: NEGATIVE
HIV 1+2 AB+HIV1 P24 AG SERPL QL IA: NEGATIVE

## 2022-02-04 LAB
ALLERGY INTERPRETATION: ABNORMAL
ALMOND IGE QN: 0.49 KU/L
CASHEW NUT IGE QN: <0.1 KU/L
CATFISH IGE QN: 1.17 KU/L
DEPRECATED ALMOND IGE RAST QL: ABNORMAL
DEPRECATED CASHEW NUT IGE RAST QL: NORMAL
DEPRECATED CATFISH IGE RAST QL: ABNORMAL
DEPRECATED HAZELNUT IGE RAST QL: ABNORMAL
DEPRECATED PEANUT (RARA H) 2 IGE RAST QL: ABNORMAL
DEPRECATED PEANUT (RARA H) 2 IGE RAST QL: ABNORMAL
DEPRECATED PEANUT (RARA H) 3 IGE RAST QL: ABNORMAL
DEPRECATED PEANUT (RARA H) 6 IGE RAST QL: ABNORMAL
DEPRECATED PEANUT (RARA H) 8 IGE RAST QL: ABNORMAL
DEPRECATED PECAN/HICK NUT IGE RAST QL: NORMAL
DEPRECATED PISTACHIO IGE RAST QL: ABNORMAL
DEPRECATED SALMON IGE RAST QL: ABNORMAL
DEPRECATED SOYBEAN IGE RAST QL: ABNORMAL
DEPRECATED TUNA IGE RAST QL: ABNORMAL
DEPRECATED WALNUT IGE RAST QL: NORMAL
HAZELNUT IGE QN: 0.96 KU/L
HSV1 IGG SERPL QL IA: POSITIVE
HSV2 IGG SERPL QL IA: NEGATIVE
PEANUT (RARA H) 1 IGE QN: 39.4 KU/L
PEANUT (RARA H) 2 IGE QN: 41.3 KU/L
PEANUT (RARA H) 3 IGE QN: 6.74 KU/L
PEANUT (RARA H) 6 IGE QN: 37.6 KU/L
PEANUT (RARA H) 8 IGE QN: <0.1 KU/L
PEANUT (RARA H) 9 IGE QN: <0.1 KU/L
PEANUT (RARA H) 9 IGE QN: ABNORMAL
PECAN/HICK NUT IGE QN: <0.1 KU/L
PISTACHIO IGE QN: 0.32 KU/L
SALMON IGE QN: 0.13 KU/L
SOYBEAN IGE QN: 2.28 KU/L
TUNA IGE QN: 0.4 KU/L
WALNUT IGE QN: <0.1 KU/L

## 2022-02-09 ENCOUNTER — TELEPHONE (OUTPATIENT)
Dept: PULMONOLOGY | Facility: CLINIC | Age: 22
End: 2022-02-09
Payer: COMMERCIAL

## 2022-02-10 ENCOUNTER — CLINICAL SUPPORT (OUTPATIENT)
Dept: PULMONOLOGY | Facility: CLINIC | Age: 22
End: 2022-02-10
Payer: COMMERCIAL

## 2022-02-10 DIAGNOSIS — J45.20 MILD INTERMITTENT ASTHMA WITHOUT COMPLICATION: ICD-10-CM

## 2022-02-10 DIAGNOSIS — Z01.818 PRE-PROCEDURAL EXAMINATION: Primary | ICD-10-CM

## 2022-02-10 LAB
CTP QC/QA: YES
SARS-COV-2 AG RESP QL IA.RAPID: NEGATIVE

## 2022-02-10 PROCEDURE — 87811 SARS CORONAVIRUS 2 ANTIGEN POCT, MANUAL READ: ICD-10-PCS | Mod: S$GLB,,, | Performed by: INTERNAL MEDICINE

## 2022-02-10 PROCEDURE — 94010 BREATHING CAPACITY TEST: ICD-10-PCS | Mod: S$GLB,,, | Performed by: INTERNAL MEDICINE

## 2022-02-10 PROCEDURE — 94726 PULM FUNCT TST PLETHYSMOGRAP: ICD-10-PCS | Mod: S$GLB,,, | Performed by: INTERNAL MEDICINE

## 2022-02-10 PROCEDURE — 94729 DIFFUSING CAPACITY: CPT | Mod: S$GLB,,, | Performed by: INTERNAL MEDICINE

## 2022-02-10 PROCEDURE — 87811 SARS-COV-2 COVID19 W/OPTIC: CPT | Mod: S$GLB,,, | Performed by: INTERNAL MEDICINE

## 2022-02-10 PROCEDURE — 94010 BREATHING CAPACITY TEST: CPT | Mod: S$GLB,,, | Performed by: INTERNAL MEDICINE

## 2022-02-10 PROCEDURE — 94726 PLETHYSMOGRAPHY LUNG VOLUMES: CPT | Mod: S$GLB,,, | Performed by: INTERNAL MEDICINE

## 2022-02-10 PROCEDURE — 94729 PR C02/MEMBANE DIFFUSE CAPACITY: ICD-10-PCS | Mod: S$GLB,,, | Performed by: INTERNAL MEDICINE

## 2022-02-11 LAB
BRPFT: NORMAL
DLCO ADJ PRE: 29.91 ML/(MIN*MMHG)
DLCO SINGLE BREATH LLN: 27.86
DLCO SINGLE BREATH PRE REF: 92.1 %
DLCO SINGLE BREATH REF: 34.79
DLCOC SBVA LLN: 3.77
DLCOC SBVA PRE REF: 129.4 %
DLCOC SBVA REF: 5.1
DLCOC SINGLE BREATH LLN: 27.86
DLCOC SINGLE BREATH PRE REF: 86 %
DLCOC SINGLE BREATH REF: 34.79
DLCOVA LLN: 3.77
DLCOVA PRE REF: 138.6 %
DLCOVA PRE: 7.07 ML/(MIN*MMHG*L)
DLCOVA REF: 5.1
DLVAADJ PRE: 6.6 ML/(MIN*MMHG*L)
ERV LLN: -16448.39
ERV PRE REF: 100.2 %
ERV REF: 1.61
FEF 25 75 LLN: 2.54
FEF 25 75 PRE REF: 49.1 %
FEF 25 75 REF: 4.24
FEV1 FVC LLN: 75
FEV1 FVC PRE REF: 83.2 %
FEV1 FVC REF: 87
FEV1 LLN: 2.98
FEV1 PRE REF: 77.1 %
FEV1 REF: 3.78
FRCPLETH LLN: 2.22
FRCPLETH PREREF: 84.5 %
FRCPLETH REF: 3.21
FVC LLN: 3.49
FVC PRE REF: 91.9 %
FVC REF: 4.39
IVC PRE: 3.34 L
IVC SINGLE BREATH LLN: 3.49
IVC SINGLE BREATH PRE REF: 76.2 %
IVC SINGLE BREATH REF: 4.39
MVV LLN: 145
MVV PRE REF: 50.4 %
MVV REF: 171
PEF LLN: 6.71
PEF PRE REF: 67.7 %
PEF REF: 9.25
PRE DLCO: 32.03 ML/(MIN*MMHG)
PRE ERV: 1.61 L
PRE FEF 25 75: 2.08 L/S
PRE FET 100: 6.73 SEC
PRE FEV1 FVC: 72.15 %
PRE FEV1: 2.91 L
PRE FRC PL: 2.71 L
PRE FVC: 4.03 L
PRE MVV: 86 L/MIN
PRE PEF: 6.27 L/S
PRE RV: 0.46 L
PRE TLC: 4.49 L
RAW LLN: 3.06
RAW PRE REF: 238.2 %
RAW PRE: 7.29 CMH2O*S/L
RAW REF: 3.06
RV LLN: 0.92
RV PRE REF: 28.4 %
RV REF: 1.6
RVTLC LLN: 15
RVTLC PRE REF: 42.8 %
RVTLC PRE: 10.14 %
RVTLC REF: 24
TLC LLN: 5.67
TLC PRE REF: 65.8 %
TLC REF: 6.82
VA PRE: 4.53 L
VA SINGLE BREATH LLN: 6.67
VA SINGLE BREATH PRE REF: 67.9 %
VA SINGLE BREATH REF: 6.67
VC LLN: 3.49
VC PRE REF: 91.9 %
VC PRE: 4.03 L
VC REF: 4.39
VTGRAWPRE: 2.58 L

## 2022-02-14 DIAGNOSIS — J98.4 RESTRICTIVE LUNG DISEASE: Primary | ICD-10-CM

## 2022-02-14 DIAGNOSIS — J45.20 MILD INTERMITTENT ASTHMA WITHOUT COMPLICATION: ICD-10-CM

## 2022-03-21 ENCOUNTER — PATIENT OUTREACH (OUTPATIENT)
Dept: ADMINISTRATIVE | Facility: OTHER | Age: 22
End: 2022-03-21
Payer: COMMERCIAL

## 2022-09-26 NOTE — PROGRESS NOTES
CrCl cannot be calculated (Unknown ideal weight.). according to latest data  -Continue to monitor renal function with daily labs   -Avoid nephrotoxins  -Renally dose all medications   -Monitor events that may lead to decreased renal perfusion (hypovolemia, hypotension, sepsis)  -Monitor urine output to assure that no obstruction precipitates worsening in GFR   Subjective:      Rob Villanueva is a 17 y.o. male here with mother. Patient brought in for Fever      History of Present Illness:  HPI  Rob Villanueva is a 17 y.o. male.  Yesterday, began with headache, body aches, cough, sore throat, nasal congestion.   Is drinking, but not eating well. Vomited this am. No fever med since early this AM.     Review of Systems   Constitutional: Positive for activity change, appetite change and fever.   HENT: Positive for congestion and sore throat. Negative for ear pain.    Eyes: Negative for photophobia.   Respiratory: Positive for cough.    Musculoskeletal: Positive for myalgias.   Neurological: Positive for headaches.       Objective:     Physical Exam   Constitutional: He appears well-developed and well-nourished. No distress.   Sitting quietly in chair   HENT:   Mouth/Throat: Oropharynx is clear and moist. No oropharyngeal exudate.   TMs gray  Nasal congestion/sniffles   Eyes: Conjunctivae are normal. Right eye exhibits no discharge. Left eye exhibits no discharge.   Neck: Normal range of motion. Neck supple.   Cardiovascular: Normal rate, regular rhythm and normal heart sounds.    No murmur heard.  Pulmonary/Chest: Effort normal and breath sounds normal. No respiratory distress.   Abdominal: Soft. He exhibits no distension. There is no tenderness.   Lymphadenopathy:     He has no cervical adenopathy.   Neurological: He is alert.   Skin: Skin is warm. Capillary refill takes less than 2 seconds. No rash noted.   Psychiatric: He has a normal mood and affect. His behavior is normal.   Nursing note and vitals reviewed.       Vitals:    11/16/17 1858   Pulse: (!) 118   Temp: (!) 103.7 °F (39.8 °C)       Assessment:        1. Influenza A    2. Fever in other diseases         Plan:       Rob was seen today for fever.    Diagnoses and all orders for this visit:    POCT influenza test:   Influenza A +    Influenza A  -     oseltamivir 6 mg/mL SusR; Take 12.5 mLs (75 mg total) by mouth 2  (two) times daily.   motrin q 6 hrs prn, encourage fluids, return if symptoms worsen, not drinking well, change in cough, any concerns  -     ibuprofen 100 mg/5 mL suspension 500 mg; Take 25 mLs (500 mg total) by mouth one time (in office).  Infection control to prevent spread.

## 2023-01-02 NOTE — TELEPHONE ENCOUNTER
----- Message from Judy Quinn sent at 5/25/2017  4:08 PM CDT -----  Contact: [phone]  mom  Pt needs a script for albuterol inhaler [facility].    Residential stability/Relationship stability

## 2023-01-25 ENCOUNTER — PATIENT MESSAGE (OUTPATIENT)
Dept: ADMINISTRATIVE | Facility: HOSPITAL | Age: 23
End: 2023-01-25
Payer: COMMERCIAL

## 2023-06-05 DIAGNOSIS — J45.20 MILD INTERMITTENT ASTHMA WITHOUT COMPLICATION: ICD-10-CM

## 2023-06-05 RX ORDER — FLUTICASONE FUROATE, UMECLIDINIUM BROMIDE AND VILANTEROL TRIFENATATE 200; 62.5; 25 UG/1; UG/1; UG/1
1 POWDER RESPIRATORY (INHALATION) DAILY
Qty: 60 EACH | Refills: 1 | OUTPATIENT
Start: 2023-06-05

## 2023-06-05 RX ORDER — FLUTICASONE FUROATE, UMECLIDINIUM BROMIDE AND VILANTEROL TRIFENATATE 200; 62.5; 25 UG/1; UG/1; UG/1
1 POWDER RESPIRATORY (INHALATION) DAILY
Qty: 60 EACH | Refills: 1 | Status: SHIPPED | OUTPATIENT
Start: 2023-06-05 | End: 2023-10-12 | Stop reason: SDUPTHER

## 2023-08-31 DIAGNOSIS — J45.20 MILD INTERMITTENT ASTHMA WITHOUT COMPLICATION: ICD-10-CM

## 2023-08-31 RX ORDER — FLUTICASONE FUROATE, UMECLIDINIUM BROMIDE AND VILANTEROL TRIFENATATE 200; 62.5; 25 UG/1; UG/1; UG/1
1 POWDER RESPIRATORY (INHALATION)
Qty: 60 EACH | Refills: 1 | OUTPATIENT
Start: 2023-08-31

## 2023-10-05 DIAGNOSIS — J45.20 MILD INTERMITTENT ASTHMA WITHOUT COMPLICATION: ICD-10-CM

## 2023-10-05 RX ORDER — MONTELUKAST SODIUM 10 MG/1
TABLET ORAL
Qty: 90 TABLET | Refills: 1 | OUTPATIENT
Start: 2023-10-05

## 2023-10-05 RX ORDER — FLUTICASONE FUROATE, UMECLIDINIUM BROMIDE AND VILANTEROL TRIFENATATE 200; 62.5; 25 UG/1; UG/1; UG/1
1 POWDER RESPIRATORY (INHALATION) DAILY
Qty: 60 EACH | Refills: 1 | OUTPATIENT
Start: 2023-10-05

## 2023-10-10 DIAGNOSIS — J45.20 MILD INTERMITTENT ASTHMA WITHOUT COMPLICATION: ICD-10-CM

## 2023-10-11 ENCOUNTER — PATIENT MESSAGE (OUTPATIENT)
Dept: ALLERGY | Facility: CLINIC | Age: 23
End: 2023-10-11
Payer: COMMERCIAL

## 2023-10-11 RX ORDER — FLUTICASONE FUROATE, UMECLIDINIUM BROMIDE AND VILANTEROL TRIFENATATE 200; 62.5; 25 UG/1; UG/1; UG/1
1 POWDER RESPIRATORY (INHALATION) DAILY
Qty: 60 EACH | Refills: 1 | OUTPATIENT
Start: 2023-10-11

## 2023-10-12 ENCOUNTER — TELEPHONE (OUTPATIENT)
Dept: ALLERGY | Facility: CLINIC | Age: 23
End: 2023-10-12

## 2023-10-12 ENCOUNTER — OFFICE VISIT (OUTPATIENT)
Dept: ALLERGY | Facility: CLINIC | Age: 23
End: 2023-10-12
Payer: COMMERCIAL

## 2023-10-12 DIAGNOSIS — J45.990 EXERCISE-INDUCED ASTHMA: ICD-10-CM

## 2023-10-12 DIAGNOSIS — J45.20 MILD INTERMITTENT ASTHMA WITHOUT COMPLICATION: ICD-10-CM

## 2023-10-12 DIAGNOSIS — Z91.010 PEANUT ALLERGY: ICD-10-CM

## 2023-10-12 DIAGNOSIS — Z91.018 TREE NUT ALLERGY: ICD-10-CM

## 2023-10-12 DIAGNOSIS — Z91.013 FISH ALLERGY: ICD-10-CM

## 2023-10-12 DIAGNOSIS — Z91.018 MULTIPLE FOOD ALLERGIES: Primary | ICD-10-CM

## 2023-10-12 PROCEDURE — 1160F PR REVIEW ALL MEDS BY PRESCRIBER/CLIN PHARMACIST DOCUMENTED: ICD-10-PCS | Mod: CPTII,95,, | Performed by: ALLERGY & IMMUNOLOGY

## 2023-10-12 PROCEDURE — 99214 PR OFFICE/OUTPT VISIT, EST, LEVL IV, 30-39 MIN: ICD-10-PCS | Mod: 95,,, | Performed by: ALLERGY & IMMUNOLOGY

## 2023-10-12 PROCEDURE — 1159F MED LIST DOCD IN RCRD: CPT | Mod: CPTII,95,, | Performed by: ALLERGY & IMMUNOLOGY

## 2023-10-12 PROCEDURE — 1160F RVW MEDS BY RX/DR IN RCRD: CPT | Mod: CPTII,95,, | Performed by: ALLERGY & IMMUNOLOGY

## 2023-10-12 PROCEDURE — 99214 OFFICE O/P EST MOD 30 MIN: CPT | Mod: 95,,, | Performed by: ALLERGY & IMMUNOLOGY

## 2023-10-12 PROCEDURE — 1159F PR MEDICATION LIST DOCUMENTED IN MEDICAL RECORD: ICD-10-PCS | Mod: CPTII,95,, | Performed by: ALLERGY & IMMUNOLOGY

## 2023-10-12 RX ORDER — ALBUTEROL SULFATE 90 UG/1
AEROSOL, METERED RESPIRATORY (INHALATION)
Qty: 18 G | Refills: 3 | Status: SHIPPED | OUTPATIENT
Start: 2023-10-12

## 2023-10-12 RX ORDER — FLUTICASONE FUROATE, UMECLIDINIUM BROMIDE AND VILANTEROL TRIFENATATE 200; 62.5; 25 UG/1; UG/1; UG/1
1 POWDER RESPIRATORY (INHALATION) DAILY
Qty: 60 EACH | Refills: 4 | Status: SHIPPED | OUTPATIENT
Start: 2023-10-12

## 2023-10-12 RX ORDER — EPINEPHRINE 0.3 MG/.3ML
1 INJECTION SUBCUTANEOUS ONCE
Qty: 2 EACH | Refills: 0 | Status: SHIPPED | OUTPATIENT
Start: 2023-10-12 | End: 2023-10-12

## 2023-10-12 NOTE — PROGRESS NOTES
Subjective:       Patient ID: Rob Villanueva is a 23 y.o. male.    The patient location is: [place]   The chief complaint leading to consultation is: Follow up    Visit type: audiovisual    Face to Face time with patient: 33 minutes of total time spent on the encounter, which includes face to face time and non-face to face time preparing to see the patient (eg, review of tests), Obtaining and/or reviewing separately obtained history, Documenting clinical information in the electronic or other health record, Independently interpreting results (not separately reported) and communicating results to the patient/family/caregiver, or Care coordination (not separately reported).         Each patient to whom he or she provides medical services by telemedicine is:  (1) informed of the relationship between the physician and patient and the respective role of any other health care provider with respect to management of the patient; and (2) notified that he or she may decline to receive medical services by telemedicine and may withdraw from such care at any time.    Notes:        Chief Complaint:  Follow-up      HPI: 23 year old male   Peanut, soybean, tree nuts and fish  Avoiding  Not required epipen    Asthma-diagnosed as a child  Trelegy-last taken 1 week ago  No oral steroids  Last required albuterol-doesn't know  Exercise induced cough, wheeze  He  does take albuterol prior to exercise        No runny nose  Claritin prn    Atopic dermatitis-doing well  antecubital fossa   acqauphor  No steroid creams  Soap- Aveeno      He eats shrimp, lobster and crab without symptoms.    Past Medical History:   Diagnosis Date    Asthma     Eczema        Family History   Problem Relation Age of Onset    Asthma Father     Allergies Father     Hearing loss Father     Transient ischemic attack Father     Diabetes Paternal Grandmother     Cancer Paternal Grandfather     Diabetes Paternal Grandfather     Heart disease Paternal Grandfather      Hyperlipidemia Paternal Grandfather     Hypertension Paternal Grandfather     Obesity Mother     Early death Neg Hx     Eczema Neg Hx     Migraines Neg Hx     Thyroid disease Neg Hx        Current Outpatient Medications on File Prior to Visit   Medication Sig Dispense Refill    [DISCONTINUED] albuterol (PROVENTIL/VENTOLIN HFA) 90 mcg/actuation inhaler Rescue 18 g 3    [DISCONTINUED] fluticasone-umeclidin-vilanter (TRELEGY ELLIPTA) 200-62.5-25 mcg inhaler Inhale 1 puff into the lungs once daily. 60 each 1    fluticasone (VERAMYST) 27.5 mcg/actuation nasal spray 1 spray by Nasal route once daily. (Patient not taking: Reported on 10/12/2023) 10 g 11    montelukast (SINGULAIR) 10 mg tablet TAKE 1 TABLET(10 MG) BY MOUTH EVERY EVENING (Patient not taking: Reported on 10/12/2023) 90 tablet 1    [DISCONTINUED] EPINEPHrine (EPIPEN) 0.3 mg/0.3 mL AtIn Inject 0.3 mLs (0.3 mg total) into the muscle once. for 1 dose 2 each 0     No current facility-administered medications on file prior to visit.       Review of patient's allergies indicates:   Allergen Reactions    Fish containing products     Peanut (legumes)     Soy     Tree nut        Environmental History: Pets in the home: dogs (1). He does not smoke.  Review of Systems   Constitutional:  Negative for chills and fever.   HENT:  Positive for congestion and rhinorrhea.    Eyes:  Negative for discharge and itching.   Respiratory:  Negative for cough, shortness of breath and wheezing.    Cardiovascular:  Negative for chest pain and leg swelling.   Gastrointestinal:  Negative for nausea and vomiting.   Endocrine: Negative for cold intolerance and heat intolerance.   Skin:  Positive for rash. Negative for wound.   Allergic/Immunologic: Positive for environmental allergies and food allergies.   Neurological:  Negative for facial asymmetry and speech difficulty.   Hematological:  Negative for adenopathy. Does not bruise/bleed easily.   Psychiatric/Behavioral:  Negative for  behavioral problems and suicidal ideas.         Objective:    Physical Exam  Vitals reviewed.   Constitutional:       General: He is not in acute distress.     Appearance: Normal appearance. He is not ill-appearing, toxic-appearing or diaphoretic.   HENT:      Head: Normocephalic and atraumatic.      Right Ear: Tympanic membrane, ear canal and external ear normal. There is no impacted cerumen.      Left Ear: Tympanic membrane, ear canal and external ear normal. There is no impacted cerumen.      Nose: Nose normal. No congestion or rhinorrhea.      Mouth/Throat:      Mouth: Mucous membranes are moist.      Pharynx: No oropharyngeal exudate or posterior oropharyngeal erythema.   Eyes:      General: No scleral icterus.        Right eye: No discharge.         Left eye: No discharge.      Pupils: Pupils are equal, round, and reactive to light.   Neck:      Vascular: No carotid bruit.   Cardiovascular:      Rate and Rhythm: Normal rate and regular rhythm.      Heart sounds: Normal heart sounds. No murmur heard.     No friction rub. No gallop.   Pulmonary:      Effort: Pulmonary effort is normal. No respiratory distress.      Breath sounds: Normal breath sounds. No stridor. No wheezing, rhonchi or rales.   Chest:      Chest wall: No tenderness.   Abdominal:      General: There is no distension.      Palpations: There is no mass.      Tenderness: There is no abdominal tenderness. There is no guarding or rebound.      Hernia: No hernia is present.   Musculoskeletal:         General: No swelling, tenderness, deformity or signs of injury. Normal range of motion.      Cervical back: Normal range of motion and neck supple. No rigidity or tenderness.      Right lower leg: No edema.      Left lower leg: No edema.   Lymphadenopathy:      Cervical: No cervical adenopathy.   Skin:     General: Skin is warm.      Coloration: Skin is not jaundiced or pale.      Findings: No bruising, erythema or lesion.   Neurological:      General: No  focal deficit present.      Mental Status: He is alert and oriented to person, place, and time.      Cranial Nerves: No cranial nerve deficit.      Motor: No weakness.      Gait: Gait normal.   Psychiatric:         Mood and Affect: Mood normal.         Behavior: Behavior normal.         Thought Content: Thought content normal.         Judgment: Judgment normal.         Assessment:       1. Multiple food allergies    2. Mild intermittent asthma without complication    3. Fish allergy    4. Peanut allergy    5. Tree nut allergy    6. Exercise-induced asthma           Plan:         Multiple food allergies  -     EPINEPHrine (EPIPEN) 0.3 mg/0.3 mL AtIn; Inject 0.3 mLs (0.3 mg total) into the muscle once. for 1 dose  Dispense: 2 each; Refill: 0    Mild intermittent asthma without complication  -     fluticasone-umeclidin-vilanter (TRELEGY ELLIPTA) 200-62.5-25 mcg inhaler; Inhale 1 puff into the lungs once daily.  Dispense: 60 each; Refill: 4  -     albuterol (PROVENTIL/VENTOLIN HFA) 90 mcg/actuation inhaler; Rescue  Dispense: 18 g; Refill: 3    Fish allergy    Peanut allergy    Tree nut allergy    Exercise-induced asthma      Medications refilled. Needs spirometry, when he can be seen in person.  Recommend continued avoidance of foods that cause symptoms- peanuts, tree nuts, soybean and fish.  Epipen 2 pack  RTC 4-6 months or sooner, if needed    ALEX DELVALLE                    Problems Address                                                 Amount and/or Complexity                                                                      Risk         4            [] One or more chronic illness with exacerbation,              [] Moderate                                                                      [x] Moderate                 Progression, or side effects of treatment                            -test documents or independent historians                        Prescription drug management                [x]  2 or more sable  chronic illnesses                                    [] Independent interpretation of tests                              Minor surgery with identifiable risk                [] 1 undiagnosed new problem with uncertain prognosis    [] Discussion or management of test results                    elective major surgery                [] 1 acute illness with                systemic symptoms                                                                                                                                                              [] 1 acute complicated injury                                                                                                                                          Elective major surgery                                                                                                                                                                                                                                                                                                                                                                                                  5            [] 1 or more chronic illnesses with severe exacerbation,     [] Extensive(two from below)                                         [] High                                                                                                               [] Independent interpretation of results                         Drug therapy requiring intensive                                                                                                               []Discussion of management or test interpretation           monitoring                                                                                                                                                                                                       Decision to de-escalate care                 [] 1 acute or chronic illness or injury that  poses a threat                                                                                               Decision regarding hospitalization

## 2023-11-30 ENCOUNTER — OFFICE VISIT (OUTPATIENT)
Dept: URGENT CARE | Facility: CLINIC | Age: 23
End: 2023-11-30
Payer: COMMERCIAL

## 2023-11-30 VITALS
HEART RATE: 68 BPM | WEIGHT: 169 LBS | RESPIRATION RATE: 19 BRPM | OXYGEN SATURATION: 97 % | HEIGHT: 68 IN | BODY MASS INDEX: 25.61 KG/M2 | DIASTOLIC BLOOD PRESSURE: 71 MMHG | SYSTOLIC BLOOD PRESSURE: 124 MMHG

## 2023-11-30 DIAGNOSIS — J10.1 INFLUENZA A: Primary | ICD-10-CM

## 2023-11-30 LAB
CTP QC/QA: YES
CTP QC/QA: YES
POC MOLECULAR INFLUENZA A AGN: POSITIVE
POC MOLECULAR INFLUENZA B AGN: NEGATIVE
SARS-COV-2 AG RESP QL IA.RAPID: NEGATIVE

## 2023-11-30 PROCEDURE — 99214 PR OFFICE/OUTPT VISIT, EST, LEVL IV, 30-39 MIN: ICD-10-PCS | Mod: S$GLB,,, | Performed by: NURSE PRACTITIONER

## 2023-11-30 PROCEDURE — 99214 OFFICE O/P EST MOD 30 MIN: CPT | Mod: S$GLB,,, | Performed by: NURSE PRACTITIONER

## 2023-11-30 PROCEDURE — 87502 POCT INFLUENZA A/B MOLECULAR: ICD-10-PCS | Mod: QW,S$GLB,, | Performed by: NURSE PRACTITIONER

## 2023-11-30 PROCEDURE — 87811 SARS-COV-2 COVID19 W/OPTIC: CPT | Mod: QW,S$GLB,, | Performed by: NURSE PRACTITIONER

## 2023-11-30 PROCEDURE — 87811 SARS CORONAVIRUS 2 ANTIGEN POCT, MANUAL READ: ICD-10-PCS | Mod: QW,S$GLB,, | Performed by: NURSE PRACTITIONER

## 2023-11-30 PROCEDURE — 87502 INFLUENZA DNA AMP PROBE: CPT | Mod: QW,S$GLB,, | Performed by: NURSE PRACTITIONER

## 2023-11-30 RX ORDER — OSELTAMIVIR PHOSPHATE 75 MG/1
75 CAPSULE ORAL 2 TIMES DAILY
Qty: 10 CAPSULE | Refills: 0 | Status: SHIPPED | OUTPATIENT
Start: 2023-11-30 | End: 2023-12-05

## 2023-11-30 NOTE — PATIENT INSTRUCTIONS
"    FLU    Your Rapid FLU test was POSITIVE FOR INFLUENZA    -  Take full course of Tamilfu as prescribed.  If symptoms began over 48 hours ago, you are not eligible for Tamiflu.  Symptomatic management is recommended as treatment.    - Rest at home.     - Drink plenty of fluids so you won't get dehydrated.    - Do not share any utensils or share drinks     - Wash hands frequently    - you can take plain Mucinex (guaifenesin) 1200 mg twice a day to help loosen mucous.     Avoid taking Decongestants such as Sudafed, pseudoephedrine, phenylephrine or meds that say "cold," "sinus" or "-D".           - Fever/Pain recommendations:  Alternate Tylenol or Ibuprofen as directed for fever/pain.     Take ibuprofen/Motrin/Advil every 6-8 hours for pain and inflammation.  Do not take ibuprofen if you have a history of GI bleeding, kidney disease, or if you take blood thinners.    You can also take acetaminophen/Tylenol every 6-8 hours for added pain relief. Avoid tylenol if you have a history of liver disease.        - Cough recommendations:  Warm tea with honey can help with cough. Honey is a natural cough suppressant.    Dextromethorphan (DM) is a cough suppressant over the counter (ie. mucinex DM, delsym).       -Sore throat recommendations: Warm fluids, warm salt water gargles, throat lozenges, tea, honey, soup, or drinking something cold or frozen.  Throat lozenges or sprays help reduce pain. Gargling with warm saltwater (1/4 teaspoon of salt in 1/2 cup of warm water) or an OTC anesthetic gargle may be useful for irritation.    - Follow up with your PCP or specialty clinic as directed in the next 1-2 weeks if not improved or as needed.  You can call [phone] to schedule an appointment with the appropriate provider.      - If your condition worsens or fails to improve we recommend that you receive another evaluation at the ER immediately or contact your PCP to discuss your concerns or return here.    When to seek " medical advice  Call your healthcare provider right away if any of these occur:  Fever that is poorly controlled with OTC fever reducing medication  New or worsening ear pain, sinus pain, or headache  Stiff neck  You can't swallow liquids or you can't open your mouth wide because of throat pain  Signs of dehydration. These include very dark urine or no urine, sunken eyes, and dizziness.  Trouble breathing or noisy breathing  Muffled voice  Rash

## 2023-11-30 NOTE — LETTER
November 30, 2023      [facility] [address]  [facility] [address]  Phone: [phone]  Fax: [phone]       Patient: Rob Villanueva   YOB: 2000  Date of Visit: 11/30/2023    To Whom It May Concern:    Elizabeth Villanueva  was at [facility] on 11/30/2023. The patient may return to work/school on 12/3/2023 with no restrictions. If you have any questions or concerns, or if I can be of further assistance, please do not hesitate to contact me.    Sincerely,    Matt Henderson NP

## 2023-11-30 NOTE — PROGRESS NOTES
"Subjective:      Patient ID: Rob Villanueva is a 23 y.o. male.    Vitals:  height is 5' 8" (1.727 m) and weight is 76.7 kg (169 lb). His blood pressure is 124/71 and his pulse is 68. His respiration is 19 and oxygen saturation is 97%.     Chief Complaint: Cough    Cough  This is a new problem. The current episode started in the past 7 days (started on monday). The problem has been unchanged. The problem occurs every few minutes. The cough is Non-productive. Associated symptoms include chills, nasal congestion and postnasal drip. Pertinent negatives include no chest pain, fever, headaches, heartburn, hemoptysis, rash, sore throat, weight loss or wheezing. Nothing aggravates the symptoms. He has tried nothing for the symptoms. There is no history of asthma, bronchitis, COPD or pneumonia.       Constitution: Positive for chills and fatigue. Negative for fever.   HENT:  Positive for postnasal drip. Negative for sore throat.    Neck: neck negative.   Cardiovascular:  Negative for chest pain.   Respiratory:  Positive for cough. Negative for bloody sputum and wheezing.    Gastrointestinal:  Negative for heartburn.   Skin:  Negative for rash.   Neurological:  Negative for headaches.      Objective:     Physical Exam   Constitutional: He appears ill.   HENT:   Head: Normocephalic.   Nose: Nose normal.   Mouth/Throat: Mucous membranes are moist. Oropharynx is clear.   Cardiovascular: Normal rate and regular rhythm.   Pulmonary/Chest: Effort normal and breath sounds normal.   Neurological: He is alert.   Skin: Skin is warm and dry.     Assessment:     1. Body aches        Plan:       Body aches  -     SARS Coronavirus 2 Antigen, POCT Manual Read  -     POCT Influenza A/B MOLECULAR      Results for orders placed or performed in visit on 11/30/23   SARS Coronavirus 2 Antigen, POCT Manual Read   Result Value Ref Range    SARS Coronavirus 2 Antigen Negative Negative     Acceptable Yes    POCT Influenza A/B MOLECULAR " "  Result Value Ref Range    POC Molecular Influenza A Ag Positive (A) Negative, Not Reported    POC Molecular Influenza B Ag Negative Negative, Not Reported     Acceptable Yes      Patient Instructions       FLU    Your Rapid FLU test was POSITIVE FOR INFLUENZA    -  Take full course of Tamilfu as prescribed.  If symptoms began over 48 hours ago, you are not eligible for Tamiflu.  Symptomatic management is recommended as treatment.    - Rest at home.     - Drink plenty of fluids so you won't get dehydrated.    - Do not share any utensils or share drinks     - Wash hands frequently    - you can take plain Mucinex (guaifenesin) 1200 mg twice a day to help loosen mucous.     Avoid taking Decongestants such as Sudafed, pseudoephedrine, phenylephrine or meds that say "cold," "sinus" or "-D".           - Fever/Pain recommendations:  Alternate Tylenol or Ibuprofen as directed for fever/pain.     Take ibuprofen/Motrin/Advil every 6-8 hours for pain and inflammation.  Do not take ibuprofen if you have a history of GI bleeding, kidney disease, or if you take blood thinners.    You can also take acetaminophen/Tylenol every 6-8 hours for added pain relief. Avoid tylenol if you have a history of liver disease.        - Cough recommendations:  Warm tea with honey can help with cough. Honey is a natural cough suppressant.    Dextromethorphan (DM) is a cough suppressant over the counter (ie. mucinex DM, delsym).       -Sore throat recommendations: Warm fluids, warm salt water gargles, throat lozenges, tea, honey, soup, or drinking something cold or frozen.  Throat lozenges or sprays help reduce pain. Gargling with warm saltwater (1/4 teaspoon of salt in 1/2 cup of warm water) or an OTC anesthetic gargle may be useful for irritation.    - Follow up with your PCP or specialty clinic as directed in the next 1-2 weeks if not improved or as needed.  You can call [phone] to schedule an appointment with the " appropriate provider.      - If your condition worsens or fails to improve we recommend that you receive another evaluation at the ER immediately or contact your PCP to discuss your concerns or return here.    When to seek medical advice  Call your healthcare provider right away if any of these occur:  Fever that is poorly controlled with OTC fever reducing medication  New or worsening ear pain, sinus pain, or headache  Stiff neck  You can't swallow liquids or you can't open your mouth wide because of throat pain  Signs of dehydration. These include very dark urine or no urine, sunken eyes, and dizziness.  Trouble breathing or noisy breathing  Muffled voice  Rash

## 2023-12-06 ENCOUNTER — IMMUNIZATION (OUTPATIENT)
Dept: INTERNAL MEDICINE | Facility: CLINIC | Age: 23
End: 2023-12-06
Payer: COMMERCIAL

## 2023-12-06 ENCOUNTER — OFFICE VISIT (OUTPATIENT)
Dept: INTERNAL MEDICINE | Facility: CLINIC | Age: 23
End: 2023-12-06
Payer: COMMERCIAL

## 2023-12-06 VITALS
SYSTOLIC BLOOD PRESSURE: 110 MMHG | WEIGHT: 146.81 LBS | DIASTOLIC BLOOD PRESSURE: 70 MMHG | HEART RATE: 109 BPM | OXYGEN SATURATION: 97 % | BODY MASS INDEX: 22.25 KG/M2 | HEIGHT: 68 IN

## 2023-12-06 DIAGNOSIS — Z02.0 SCHOOL PHYSICAL EXAM: Primary | ICD-10-CM

## 2023-12-06 PROCEDURE — 3008F BODY MASS INDEX DOCD: CPT | Mod: CPTII,S$GLB,, | Performed by: INTERNAL MEDICINE

## 2023-12-06 PROCEDURE — 3078F DIAST BP <80 MM HG: CPT | Mod: CPTII,S$GLB,, | Performed by: INTERNAL MEDICINE

## 2023-12-06 PROCEDURE — 3074F SYST BP LT 130 MM HG: CPT | Mod: CPTII,S$GLB,, | Performed by: INTERNAL MEDICINE

## 2023-12-06 PROCEDURE — 86580 PR  TB INTRADERMAL TEST: ICD-10-PCS | Mod: S$GLB,,, | Performed by: INTERNAL MEDICINE

## 2023-12-06 PROCEDURE — 3008F PR BODY MASS INDEX (BMI) DOCUMENTED: ICD-10-PCS | Mod: CPTII,S$GLB,, | Performed by: INTERNAL MEDICINE

## 2023-12-06 PROCEDURE — 3078F PR MOST RECENT DIASTOLIC BLOOD PRESSURE < 80 MM HG: ICD-10-PCS | Mod: CPTII,S$GLB,, | Performed by: INTERNAL MEDICINE

## 2023-12-06 PROCEDURE — 99999 PR PBB SHADOW E&M-EST. PATIENT-LVL IV: ICD-10-PCS | Mod: PBBFAC,,, | Performed by: INTERNAL MEDICINE

## 2023-12-06 PROCEDURE — 1160F RVW MEDS BY RX/DR IN RCRD: CPT | Mod: CPTII,S$GLB,, | Performed by: INTERNAL MEDICINE

## 2023-12-06 PROCEDURE — 99395 PREV VISIT EST AGE 18-39: CPT | Mod: 25,S$GLB,, | Performed by: INTERNAL MEDICINE

## 2023-12-06 PROCEDURE — 99999 PR PBB SHADOW E&M-EST. PATIENT-LVL IV: CPT | Mod: PBBFAC,,, | Performed by: INTERNAL MEDICINE

## 2023-12-06 PROCEDURE — 90715 TDAP VACCINE GREATER THAN OR EQUAL TO 7YO IM: ICD-10-PCS | Mod: S$GLB,,, | Performed by: INTERNAL MEDICINE

## 2023-12-06 PROCEDURE — 1160F PR REVIEW ALL MEDS BY PRESCRIBER/CLIN PHARMACIST DOCUMENTED: ICD-10-PCS | Mod: CPTII,S$GLB,, | Performed by: INTERNAL MEDICINE

## 2023-12-06 PROCEDURE — 1159F PR MEDICATION LIST DOCUMENTED IN MEDICAL RECORD: ICD-10-PCS | Mod: CPTII,S$GLB,, | Performed by: INTERNAL MEDICINE

## 2023-12-06 PROCEDURE — 90471 TDAP VACCINE GREATER THAN OR EQUAL TO 7YO IM: ICD-10-PCS | Mod: S$GLB,,, | Performed by: INTERNAL MEDICINE

## 2023-12-06 PROCEDURE — 90715 TDAP VACCINE 7 YRS/> IM: CPT | Mod: S$GLB,,, | Performed by: INTERNAL MEDICINE

## 2023-12-06 PROCEDURE — 90471 IMMUNIZATION ADMIN: CPT | Mod: S$GLB,,, | Performed by: INTERNAL MEDICINE

## 2023-12-06 PROCEDURE — 1159F MED LIST DOCD IN RCRD: CPT | Mod: CPTII,S$GLB,, | Performed by: INTERNAL MEDICINE

## 2023-12-06 PROCEDURE — 86580 TB INTRADERMAL TEST: CPT | Mod: S$GLB,,, | Performed by: INTERNAL MEDICINE

## 2023-12-06 PROCEDURE — 3074F PR MOST RECENT SYSTOLIC BLOOD PRESSURE < 130 MM HG: ICD-10-PCS | Mod: CPTII,S$GLB,, | Performed by: INTERNAL MEDICINE

## 2023-12-06 PROCEDURE — 99395 PR PREVENTIVE VISIT,EST,18-39: ICD-10-PCS | Mod: 25,S$GLB,, | Performed by: INTERNAL MEDICINE

## 2023-12-06 NOTE — PROGRESS NOTES
Subjective:       Patient ID: Rob Villanueva is a 23 y.o. male.    Chief Complaint: Physical Exam (Physical for school)    Here for annual exam    No complaints, here for nursing school physical.        Review of Systems   Constitutional:  Negative for appetite change and unexpected weight change.   Respiratory:  Negative for chest tightness and shortness of breath.    Cardiovascular:  Negative for chest pain.   Gastrointestinal:  Negative for abdominal pain.   Genitourinary:  Negative for difficulty urinating, scrotal swelling and testicular pain.   Skin:         No lesions           Objective:      Physical Exam  Vitals reviewed.   Constitutional:       General: He is not in acute distress.     Appearance: Normal appearance. He is well-developed. He is not ill-appearing, toxic-appearing or diaphoretic.   HENT:      Head: Normocephalic and atraumatic.   Eyes:      General: No scleral icterus.  Neck:      Thyroid: No thyromegaly.   Cardiovascular:      Rate and Rhythm: Normal rate and regular rhythm.      Heart sounds: Normal heart sounds. No murmur heard.     No friction rub. No gallop.   Pulmonary:      Effort: Pulmonary effort is normal. No respiratory distress.      Breath sounds: Normal breath sounds. No wheezing or rales.   Abdominal:      General: Bowel sounds are normal. There is no distension.      Palpations: Abdomen is soft. There is no mass.      Tenderness: There is no abdominal tenderness. There is no guarding or rebound.   Musculoskeletal:         General: Normal range of motion.      Cervical back: Normal range of motion.   Lymphadenopathy:      Cervical: No cervical adenopathy.   Skin:     Findings: No lesion.   Neurological:      Mental Status: He is alert and oriented to person, place, and time.   Psychiatric:         Mood and Affect: Mood normal.         Behavior: Behavior normal.         Thought Content: Thought content normal.         Assessment:       1. School physical exam        Plan:        Rob was seen today for physical exam.    Diagnoses and all orders for this visit:    School physical exam  -     POCT TB Skin Test Read; Future  -     tuberculin injection 5 Units        Health Maintenance         Date Due Completion Date    Hepatitis B Vaccines (3 of 3 - 3-dose series) 03/13/2003 1/16/2003    Pneumococcal Vaccines (Age 0-64) (1 - PCV) 05/09/2008 5/30/2003    TETANUS VACCINE 08/05/2023 8/5/2013    DTaP/Tdap/Td Vaccines (7 - Td or Tdap) 08/05/2023 8/5/2013    Influenza Vaccine (1) 09/01/2023 1/25/2022    COVID-19 Vaccine (2 - 2023-24 season) 09/01/2023 12/21/2021   He will have flu and tdap today         Follow up in about 1 year (around 12/6/2024).    Future Appointments   Date Time Provider Department Center   2/12/2024  8:00 AM Katty Peñaloza MD [facility]

## 2023-12-06 NOTE — PROGRESS NOTES
1620 Used 2 pt identifiers and reviewed allergies prior to giving tuberculin injection 5 Units injection in the R/forearm, VIS given then asked pt to wait 15 mins post injection for s/sx of adverse reactions.

## 2023-12-08 ENCOUNTER — CLINICAL SUPPORT (OUTPATIENT)
Dept: INTERNAL MEDICINE | Facility: CLINIC | Age: 23
End: 2023-12-08
Payer: COMMERCIAL

## 2023-12-08 DIAGNOSIS — Z02.0 SCHOOL PHYSICAL EXAM: Primary | ICD-10-CM

## 2023-12-08 LAB
TB INDURATION - 48 HR READ: 0 MM
TB INDURATION - 72 HR READ: NORMAL
TB SKIN TEST - 48 HR READ: NEGATIVE
TB SKIN TEST - 72 HR READ: NORMAL

## 2024-05-06 NOTE — PATIENT INSTRUCTIONS
1.  Get fasting bloodwork  2. Flu shot  3. Schedule pulmonary function test  4. Schedule visit with allergist  
No

## 2024-06-03 DIAGNOSIS — J45.20 MILD INTERMITTENT ASTHMA WITHOUT COMPLICATION: ICD-10-CM

## 2024-06-03 RX ORDER — FLUTICASONE FUROATE, UMECLIDINIUM BROMIDE AND VILANTEROL TRIFENATATE 200; 62.5; 25 UG/1; UG/1; UG/1
1 POWDER RESPIRATORY (INHALATION)
Qty: 60 EACH | Refills: 4 | Status: SHIPPED | OUTPATIENT
Start: 2024-06-03

## 2024-08-14 ENCOUNTER — OFFICE VISIT (OUTPATIENT)
Dept: DERMATOLOGY | Facility: CLINIC | Age: 24
End: 2024-08-14
Payer: COMMERCIAL

## 2024-08-14 DIAGNOSIS — L71.0 PERIORAL DERMATITIS: Primary | ICD-10-CM

## 2024-08-14 PROCEDURE — 99203 OFFICE O/P NEW LOW 30 MIN: CPT | Mod: S$GLB,,, | Performed by: DERMATOLOGY

## 2024-08-14 PROCEDURE — 1159F MED LIST DOCD IN RCRD: CPT | Mod: CPTII,S$GLB,, | Performed by: DERMATOLOGY

## 2024-08-14 PROCEDURE — 1160F RVW MEDS BY RX/DR IN RCRD: CPT | Mod: CPTII,S$GLB,, | Performed by: DERMATOLOGY

## 2024-08-14 PROCEDURE — 99999 PR PBB SHADOW E&M-EST. PATIENT-LVL III: CPT | Mod: PBBFAC,,, | Performed by: DERMATOLOGY

## 2024-08-14 RX ORDER — METRONIDAZOLE 7.5 MG/G
CREAM TOPICAL
Qty: 45 G | Refills: 11 | Status: SHIPPED | OUTPATIENT
Start: 2024-08-14

## 2024-08-14 NOTE — PROGRESS NOTES
Subjective:      Patient ID:  Rob Villanueva is a 24 y.o. male who presents for   Chief Complaint   Patient presents with    Rash     Rash under chin that was not going away      History of Present Illness: The patient presents with chief complaint of rash.  Location: left chin  Duration: 1 month  Signs/Symptoms: intermittent    Prior treatments: ceraVe sal acid cream, karen's choice chemical exfoliator          Review of Systems   Constitutional:  Negative for fever and chills.   Gastrointestinal:  Negative for nausea and vomiting.   Skin:  Positive for rash and activity-related sunscreen use. Negative for daily sunscreen use and recent sunburn.   Hematologic/Lymphatic: Does not bruise/bleed easily.       Objective:   Physical Exam   Constitutional: He appears well-developed and well-nourished. No distress.   Neurological: He is alert and oriented to person, place, and time. He is not disoriented.   Psychiatric: He has a normal mood and affect.   Skin:   Areas Examined (abnormalities noted in diagram):   Head / Face Inspection Performed  Neck Inspection Performed  RUE Inspected  LUE Inspection Performed  Nails and Digits Inspection Performed                Assessment / Plan:        Perioral dermatitis  -     metronidazole 0.75% (METROCREAM) 0.75 % Crea; AAA face bid  Dispense: 45 g; Refill: 11  -     recommend gentle cleanser, reduce exfoliation to 2 times/week, discussed connection to rosacea and importance of daily sunscreen for prevention of worsening. The patient acknowledged understanding. Discussed fluoride-free toothpaste. Will start above med.              Follow up in about 3 months (around 11/14/2024).

## 2024-08-14 NOTE — PATIENT INSTRUCTIONS
Perfecto's All natural toothpaste (fluoride free)    SHEER SUNSCREENS    Cetaphil Sheer Mineral Face Sunscreen SPF 50  CeraVe Sheer Hydrating Sunscreen SPF 30  Everett Brightening Moisturizer SPF 30  Umbra Sheer Physical Daily Defense SPF 30  Shiseido Ultimate Sun Protection Lotion WetForce SPF 50+  Supergoop! Unseen Sunscreen Broad Spectrum SPF 40  Neutrogena HydroBoost Water Gel Lotion SPF 50  Neutrogena Ultrasheer Face & Body Stick SPF 70

## 2024-09-10 ENCOUNTER — TELEPHONE (OUTPATIENT)
Dept: INTERNAL MEDICINE | Facility: CLINIC | Age: 24
End: 2024-09-10
Payer: COMMERCIAL

## 2024-09-23 ENCOUNTER — IMMUNIZATION (OUTPATIENT)
Dept: INTERNAL MEDICINE | Facility: CLINIC | Age: 24
End: 2024-09-23
Payer: COMMERCIAL

## 2024-09-23 DIAGNOSIS — Z23 NEED FOR VACCINATION: Primary | ICD-10-CM

## 2024-09-23 PROCEDURE — 90656 IIV3 VACC NO PRSV 0.5 ML IM: CPT | Mod: S$GLB,,, | Performed by: FAMILY MEDICINE

## 2024-09-23 PROCEDURE — 90471 IMMUNIZATION ADMIN: CPT | Mod: S$GLB,,, | Performed by: FAMILY MEDICINE

## 2024-10-01 ENCOUNTER — LAB VISIT (OUTPATIENT)
Dept: LAB | Facility: HOSPITAL | Age: 24
End: 2024-10-01
Payer: COMMERCIAL

## 2024-10-01 ENCOUNTER — OFFICE VISIT (OUTPATIENT)
Dept: INTERNAL MEDICINE | Facility: CLINIC | Age: 24
End: 2024-10-01
Payer: COMMERCIAL

## 2024-10-01 ENCOUNTER — LAB VISIT (OUTPATIENT)
Dept: LAB | Facility: HOSPITAL | Age: 24
End: 2024-10-01
Attending: PHYSICIAN ASSISTANT
Payer: COMMERCIAL

## 2024-10-01 VITALS
SYSTOLIC BLOOD PRESSURE: 110 MMHG | DIASTOLIC BLOOD PRESSURE: 78 MMHG | HEIGHT: 68 IN | HEART RATE: 73 BPM | BODY MASS INDEX: 23.12 KG/M2 | TEMPERATURE: 97 F | WEIGHT: 152.56 LBS | OXYGEN SATURATION: 98 %

## 2024-10-01 DIAGNOSIS — Z11.3 SCREEN FOR STD (SEXUALLY TRANSMITTED DISEASE): ICD-10-CM

## 2024-10-01 DIAGNOSIS — Z00.00 ENCOUNTER FOR ANNUAL PHYSICAL EXAM: Primary | ICD-10-CM

## 2024-10-01 DIAGNOSIS — R41.840 ATTENTION DEFICIT: ICD-10-CM

## 2024-10-01 DIAGNOSIS — Z00.00 ENCOUNTER FOR ANNUAL PHYSICAL EXAM: ICD-10-CM

## 2024-10-01 DIAGNOSIS — Z91.010 ALLERGY TO PEANUTS: ICD-10-CM

## 2024-10-01 DIAGNOSIS — J30.9 ALLERGIC RHINOCONJUNCTIVITIS: ICD-10-CM

## 2024-10-01 DIAGNOSIS — J45.20 MILD INTERMITTENT ASTHMA WITHOUT COMPLICATION: ICD-10-CM

## 2024-10-01 DIAGNOSIS — L30.9 ECZEMA, UNSPECIFIED TYPE: ICD-10-CM

## 2024-10-01 DIAGNOSIS — H10.10 ALLERGIC RHINOCONJUNCTIVITIS: ICD-10-CM

## 2024-10-01 LAB
ABO + RH BLD: NORMAL
ALBUMIN SERPL BCP-MCNC: 4.6 G/DL (ref 3.5–5.2)
ALP SERPL-CCNC: 51 U/L (ref 55–135)
ALT SERPL W/O P-5'-P-CCNC: 21 U/L (ref 10–44)
ANION GAP SERPL CALC-SCNC: 9 MMOL/L (ref 8–16)
AST SERPL-CCNC: 17 U/L (ref 10–40)
BILIRUB SERPL-MCNC: 0.7 MG/DL (ref 0.1–1)
BLD GP AB SCN CELLS X3 SERPL QL: NORMAL
BUN SERPL-MCNC: 13 MG/DL (ref 6–20)
CALCIUM SERPL-MCNC: 10.3 MG/DL (ref 8.7–10.5)
CHLORIDE SERPL-SCNC: 104 MMOL/L (ref 95–110)
CHOLEST SERPL-MCNC: 177 MG/DL (ref 120–199)
CHOLEST/HDLC SERPL: 3.3 {RATIO} (ref 2–5)
CO2 SERPL-SCNC: 27 MMOL/L (ref 23–29)
CREAT SERPL-MCNC: 1 MG/DL (ref 0.5–1.4)
EST. GFR  (NO RACE VARIABLE): >60 ML/MIN/1.73 M^2
GLUCOSE SERPL-MCNC: 87 MG/DL (ref 70–110)
HDLC SERPL-MCNC: 53 MG/DL (ref 40–75)
HDLC SERPL: 29.9 % (ref 20–50)
LDLC SERPL CALC-MCNC: 116.8 MG/DL (ref 63–159)
NONHDLC SERPL-MCNC: 124 MG/DL
POTASSIUM SERPL-SCNC: 4 MMOL/L (ref 3.5–5.1)
PROT SERPL-MCNC: 7.7 G/DL (ref 6–8.4)
SODIUM SERPL-SCNC: 140 MMOL/L (ref 136–145)
TREPONEMA PALLIDUM IGG+IGM AB [PRESENCE] IN SERUM OR PLASMA BY IMMUNOASSAY: NONREACTIVE
TRIGL SERPL-MCNC: 36 MG/DL (ref 30–150)
TSH SERPL DL<=0.005 MIU/L-ACNC: 1.35 UIU/ML (ref 0.4–4)

## 2024-10-01 PROCEDURE — 87491 CHLMYD TRACH DNA AMP PROBE: CPT | Performed by: PHYSICIAN ASSISTANT

## 2024-10-01 PROCEDURE — 87389 HIV-1 AG W/HIV-1&-2 AB AG IA: CPT | Performed by: PHYSICIAN ASSISTANT

## 2024-10-01 PROCEDURE — 99999 PR PBB SHADOW E&M-EST. PATIENT-LVL V: CPT | Mod: PBBFAC,,, | Performed by: PHYSICIAN ASSISTANT

## 2024-10-01 PROCEDURE — 80053 COMPREHEN METABOLIC PANEL: CPT | Performed by: PHYSICIAN ASSISTANT

## 2024-10-01 PROCEDURE — 99214 OFFICE O/P EST MOD 30 MIN: CPT | Mod: S$GLB,,, | Performed by: PHYSICIAN ASSISTANT

## 2024-10-01 PROCEDURE — 86850 RBC ANTIBODY SCREEN: CPT | Performed by: PHYSICIAN ASSISTANT

## 2024-10-01 PROCEDURE — 86593 SYPHILIS TEST NON-TREP QUANT: CPT | Performed by: PHYSICIAN ASSISTANT

## 2024-10-01 PROCEDURE — 85025 COMPLETE CBC W/AUTO DIFF WBC: CPT | Performed by: PHYSICIAN ASSISTANT

## 2024-10-01 PROCEDURE — 1159F MED LIST DOCD IN RCRD: CPT | Mod: CPTII,S$GLB,, | Performed by: PHYSICIAN ASSISTANT

## 2024-10-01 PROCEDURE — 3074F SYST BP LT 130 MM HG: CPT | Mod: CPTII,S$GLB,, | Performed by: PHYSICIAN ASSISTANT

## 2024-10-01 PROCEDURE — 87591 N.GONORRHOEAE DNA AMP PROB: CPT | Performed by: PHYSICIAN ASSISTANT

## 2024-10-01 PROCEDURE — 3008F BODY MASS INDEX DOCD: CPT | Mod: CPTII,S$GLB,, | Performed by: PHYSICIAN ASSISTANT

## 2024-10-01 PROCEDURE — 84443 ASSAY THYROID STIM HORMONE: CPT | Performed by: PHYSICIAN ASSISTANT

## 2024-10-01 PROCEDURE — 87661 TRICHOMONAS VAGINALIS AMPLIF: CPT | Performed by: PHYSICIAN ASSISTANT

## 2024-10-01 PROCEDURE — 1160F RVW MEDS BY RX/DR IN RCRD: CPT | Mod: CPTII,S$GLB,, | Performed by: PHYSICIAN ASSISTANT

## 2024-10-01 PROCEDURE — 86900 BLOOD TYPING SEROLOGIC ABO: CPT | Performed by: PHYSICIAN ASSISTANT

## 2024-10-01 PROCEDURE — 3078F DIAST BP <80 MM HG: CPT | Mod: CPTII,S$GLB,, | Performed by: PHYSICIAN ASSISTANT

## 2024-10-01 PROCEDURE — 80061 LIPID PANEL: CPT | Performed by: PHYSICIAN ASSISTANT

## 2024-10-01 RX ORDER — FLUTICASONE PROPIONATE 50 MCG
1 SPRAY, SUSPENSION (ML) NASAL DAILY
COMMUNITY

## 2024-10-01 NOTE — PROGRESS NOTES
Subjective:      Patient ID: Rob Villanueva is a 24 y.o. male.    Chief Complaint: Annual Exam and Establish Care    HPI  History of Present Illness    CHIEF COMPLAINT:  Mr. Villanueva presents today to discuss getting tested for ADHD and difficulty studying in nursing school.    ADHD SYMPTOMS:  He reports lifelong symptoms that have become more problematic with the demands of nursing school. These include difficulty studying for longer periods, procrastination (often delaying studying until a week before exams), trouble focusing while reading, and being easily distracted by text messages. He can maintain focus for about 30-45 minutes before needing an hour-long break. He acknowledges difficulty completing projects, often starting tasks but not finishing them. He describes fidgety behavior, including moving his fingers during tests. He reports a history of changing activities frequently, recalling switching video games every 30 minutes as a child.    MENTAL HEALTH:  He experiences test anxiety, manifesting as fidgeting behaviors such as moving his fingers during exams. He reports overthinking frequently and occasional feelings of depression when alone, but states it is not severe. He prefers being around others rather than by himself. He denies that his anxiety or depression are currently affecting his grades, daily activities, social life, or work performance.    MEDICAL HISTORY:  He has a history of asthma, which is well-controlled with a daily inhaler.    SOCIAL HISTORY:  He is currently a nursing school student.    MEDICATIONS:  He denies taking any current medications other than his asthma inhaler.    ALLERGIES:  He denies any known allergies.    IMMUNIZATIONS:  He is uncertain about his Hepatitis B vaccination status. He recalls receiving vaccinations at birth but is unsure about subsequent doses. He has not undergone a titer test to confirm immunity for nursing school requirements.    SEXUAL HEALTH:  He requests  an STD panel for routine screening, denying any specific symptoms prompting this request.    Medications were reviewed      Patient Active Problem List   Diagnosis    Eczema    Allergy to peanuts    Asthma, mild intermittent    Allergic rhinoconjunctivitis    Vitamin D insufficiency    Attention deficit         Current Outpatient Medications:     albuterol (PROVENTIL/VENTOLIN HFA) 90 mcg/actuation inhaler, Rescue, Disp: 18 g, Rfl: 3    EPINEPHrine (EPIPEN) 0.3 mg/0.3 mL AtIn, Inject 0.3 mLs (0.3 mg total) into the muscle once. for 1 dose, Disp: 2 each, Rfl: 0    fluticasone propionate (FLONASE) 50 mcg/actuation nasal spray, 1 spray by Each Nostril route once daily., Disp: , Rfl:     metronidazole 0.75% (METROCREAM) 0.75 % Crea, AAA face bid, Disp: 45 g, Rfl: 11    montelukast (SINGULAIR) 10 mg tablet, TAKE 1 TABLET(10 MG) BY MOUTH EVERY EVENING, Disp: 90 tablet, Rfl: 1    TRELEGY ELLIPTA 200-62.5-25 mcg inhaler, INHALE 1 PUFF INTO THE LUNGS EVERY DAY, Disp: 60 each, Rfl: 4    Review of Systems   Constitutional:  Negative for activity change, appetite change, chills, diaphoresis, fatigue, fever and unexpected weight change.   HENT: Negative.  Negative for congestion, hearing loss, postnasal drip, rhinorrhea, sore throat, trouble swallowing and voice change.    Eyes: Negative.  Negative for visual disturbance.   Respiratory: Negative.  Negative for cough, choking, chest tightness and shortness of breath.    Cardiovascular:  Negative for chest pain, palpitations and leg swelling.   Gastrointestinal:  Negative for abdominal distention, abdominal pain, blood in stool, constipation, diarrhea, nausea and vomiting.   Endocrine: Negative for cold intolerance, heat intolerance, polydipsia and polyuria.   Genitourinary: Negative.  Negative for difficulty urinating and frequency.   Musculoskeletal:  Negative for arthralgias, back pain, gait problem, joint swelling and myalgias.   Skin:  Negative for color change, pallor, rash  "and wound.   Neurological:  Negative for dizziness, tremors, weakness, light-headedness, numbness and headaches.   Hematological:  Negative for adenopathy.   Psychiatric/Behavioral:  Positive for decreased concentration. Negative for agitation, behavioral problems, confusion, dysphoric mood, hallucinations, self-injury, sleep disturbance and suicidal ideas. The patient is nervous/anxious. The patient is not hyperactive.      Objective:   /78   Pulse 73   Temp 96.9 °F (36.1 °C)   Ht 5' 8" (1.727 m)   Wt 69.2 kg (152 lb 8.9 oz)   SpO2 98%   BMI 23.20 kg/m²     Physical Exam  Vitals and nursing note reviewed.   Constitutional:       General: He is not in acute distress.     Appearance: Normal appearance. He is well-developed. He is not ill-appearing, toxic-appearing or diaphoretic.   HENT:      Head: Normocephalic and atraumatic.      Right Ear: Tympanic membrane and ear canal normal. There is no impacted cerumen.      Left Ear: Tympanic membrane and ear canal normal. There is no impacted cerumen.      Nose: Nose normal. No congestion or rhinorrhea.      Mouth/Throat:      Mouth: Mucous membranes are moist.      Pharynx: No oropharyngeal exudate or posterior oropharyngeal erythema.   Cardiovascular:      Rate and Rhythm: Normal rate and regular rhythm.      Heart sounds: Normal heart sounds. No murmur heard.     No friction rub. No gallop.   Pulmonary:      Effort: Pulmonary effort is normal. No respiratory distress.      Breath sounds: Normal breath sounds. No wheezing or rales.   Abdominal:      General: Abdomen is flat. Bowel sounds are normal.   Skin:     General: Skin is warm.      Findings: No rash.   Neurological:      Mental Status: He is alert and oriented to person, place, and time.      Coordination: Coordination normal.   Psychiatric:         Mood and Affect: Mood normal.         Behavior: Behavior normal.         Thought Content: Thought content normal.         Judgment: Judgment normal. "         Assessment:     1. Encounter for annual physical exam    2. Attention deficit    3. Screen for STD (sexually transmitted disease)    4. Eczema, unspecified type    5. Allergy to peanuts    6. Allergic rhinoconjunctivitis      Plan:   Encounter for annual physical exam  -     CBC Auto Differential; Future; Expected date: 10/01/2024  -     Comprehensive Metabolic Panel; Future  -     TSH; Future  -     Lipid Panel; Future; Expected date: 10/01/2024  -     TYPE AND SCREEN PREOP; Future; Expected date: 10/01/2024    Attention deficit  -     Ambulatory referral/consult to Psychiatry; Future; Expected date: 10/08/2024  -     TSH; Future  -     Ambulatory referral/consult to Psychiatry; Future; Expected date: 10/08/2024    Screen for STD (sexually transmitted disease)  -     HIV 1/2 Ag/Ab (4th Gen); Future; Expected date: 10/01/2024  -     C. trachomatis/N. gonorrhoeae by AMP DNA; Future; Expected date: 10/01/2024  -     Trichomonas vaginalis, RNA, Qual, Urine; Future; Expected date: 10/01/2024  -     Treponema Pallidium Antibodies IgG, IgM; Future; Expected date: 10/01/2024    Eczema, unspecified type  Allergy to peanuts  Allergic rhinoconjunctivitis  -stable. Seeing allergy dept.    Asthma  -stable on trelegy  -cont rescue albuterol inhaler as needed.    Assessment & Plan    Considered ADHD as potential cause of patient's difficulty concentrating and procrastination in nursing school  Recognized possibility of underlying anxiety contributing to focus issues  Determined official ADHD testing needed before developing treatment plan  Considered non-stimulant medications as potential future treatment option  Evaluated need for anxiety/depression treatment based on impact on daily functioning    ATTENTION DEFICIT DISORDER:  - Discussed attention deficit, including treatment options and self-modification strategies.  - Referred to psychiatry department for ADHD testing.  - Referred for behavioral cognitive therapy to  address attention deficit.  - Contact office if not contacted by psychiatry department within 2 weeks for ADHD testing appointment.    LABS:  - Ordered annual labs including cholesterol check.  - Ordered STD panel.  - Ordered blood type test.  - Results of labs and STD panel to be available in patient's MyChart.         Follow up if symptoms worsen or fail to improve.

## 2024-10-02 LAB
BASOPHILS # BLD AUTO: 0.05 K/UL (ref 0–0.2)
BASOPHILS NFR BLD: 0.9 % (ref 0–1.9)
DIFFERENTIAL METHOD BLD: ABNORMAL
EOSINOPHIL # BLD AUTO: 0.2 K/UL (ref 0–0.5)
EOSINOPHIL NFR BLD: 3.1 % (ref 0–8)
ERYTHROCYTE [DISTWIDTH] IN BLOOD BY AUTOMATED COUNT: 12.4 % (ref 11.5–14.5)
HCT VFR BLD AUTO: 51.7 % (ref 40–54)
HGB BLD-MCNC: 16.8 G/DL (ref 14–18)
HIV 1+2 AB+HIV1 P24 AG SERPL QL IA: NORMAL
IMM GRANULOCYTES # BLD AUTO: 0.01 K/UL (ref 0–0.04)
IMM GRANULOCYTES NFR BLD AUTO: 0.2 % (ref 0–0.5)
LYMPHOCYTES # BLD AUTO: 2.8 K/UL (ref 1–4.8)
LYMPHOCYTES NFR BLD: 48.6 % (ref 18–48)
MCH RBC QN AUTO: 30.5 PG (ref 27–31)
MCHC RBC AUTO-ENTMCNC: 32.5 G/DL (ref 32–36)
MCV RBC AUTO: 94 FL (ref 82–98)
MONOCYTES # BLD AUTO: 0.4 K/UL (ref 0.3–1)
MONOCYTES NFR BLD: 7.2 % (ref 4–15)
NEUTROPHILS # BLD AUTO: 2.3 K/UL (ref 1.8–7.7)
NEUTROPHILS NFR BLD: 40 % (ref 38–73)
NRBC BLD-RTO: 0 /100 WBC
PLATELET # BLD AUTO: 218 K/UL (ref 150–450)
PMV BLD AUTO: 11.2 FL (ref 9.2–12.9)
RBC # BLD AUTO: 5.5 M/UL (ref 4.6–6.2)
WBC # BLD AUTO: 5.72 K/UL (ref 3.9–12.7)

## 2024-10-03 LAB
C TRACH DNA SPEC QL NAA+PROBE: NOT DETECTED
N GONORRHOEA DNA SPEC QL NAA+PROBE: NOT DETECTED
SPECIMEN SOURCE: NORMAL
T VAGINALIS RRNA SPEC QL NAA+PROBE: NEGATIVE

## 2024-12-12 ENCOUNTER — TELEPHONE (OUTPATIENT)
Dept: DERMATOLOGY | Facility: CLINIC | Age: 24
End: 2024-12-12
Payer: COMMERCIAL

## 2025-01-14 ENCOUNTER — OCCUPATIONAL HEALTH (OUTPATIENT)
Dept: URGENT CARE | Facility: CLINIC | Age: 25
End: 2025-01-14

## 2025-01-14 VITALS — BODY MASS INDEX: 22.77 KG/M2 | WEIGHT: 150.25 LBS | HEIGHT: 68 IN

## 2025-01-14 DIAGNOSIS — Z13.9 ENCOUNTER FOR SCREENING: Primary | ICD-10-CM

## 2025-01-14 PROCEDURE — 99499 UNLISTED E&M SERVICE: CPT | Mod: S$GLB,,, | Performed by: EMERGENCY MEDICINE

## 2025-01-14 PROCEDURE — 86580 TB INTRADERMAL TEST: CPT | Mod: S$GLB,,, | Performed by: NURSE PRACTITIONER

## 2025-01-14 NOTE — PROGRESS NOTES
PPD Skin Test    Returns between 48-72 hours for interpretation. It is 5mm induration = negative. Sherlyn Ovalle

## 2025-01-17 LAB
TB INDURATION 48 - 72 HR READ: 5 MM
TB SKIN TEST 48 - 72 HR READ: NEGATIVE

## 2025-03-12 DIAGNOSIS — J45.20 MILD INTERMITTENT ASTHMA WITHOUT COMPLICATION: ICD-10-CM

## 2025-03-12 RX ORDER — FLUTICASONE FUROATE, UMECLIDINIUM BROMIDE AND VILANTEROL TRIFENATATE 200; 62.5; 25 UG/1; UG/1; UG/1
1 POWDER RESPIRATORY (INHALATION)
Qty: 60 EACH | Refills: 4 | OUTPATIENT
Start: 2025-03-12

## 2025-04-16 ENCOUNTER — E-VISIT (OUTPATIENT)
Dept: INTERNAL MEDICINE | Facility: CLINIC | Age: 25
End: 2025-04-16
Payer: COMMERCIAL

## 2025-04-16 DIAGNOSIS — J45.20 MILD INTERMITTENT ASTHMA WITHOUT COMPLICATION: ICD-10-CM

## 2025-04-17 ENCOUNTER — PATIENT MESSAGE (OUTPATIENT)
Dept: INTERNAL MEDICINE | Facility: CLINIC | Age: 25
End: 2025-04-17
Payer: COMMERCIAL

## 2025-04-17 RX ORDER — FLUTICASONE FUROATE, UMECLIDINIUM BROMIDE AND VILANTEROL TRIFENATATE 200; 62.5; 25 UG/1; UG/1; UG/1
1 POWDER RESPIRATORY (INHALATION) DAILY
Qty: 60 EACH | Refills: 4 | Status: SHIPPED | OUTPATIENT
Start: 2025-04-17

## 2025-04-17 NOTE — PROGRESS NOTES
Patient ID: Rob Villanueva is a 24 y.o. male.    Chief Complaint: General Illness (Entered automatically based on patient selection in [employer].)    The patient initiated a request through [employer] on 4/16/2025 for evaluation and management with a chief complaint of General Illness (Entered automatically based on patient selection in [employer].)     I evaluated the questionnaire submission on 4/17/2025.    [facility]-Medication    4/17/2025 12:33 PM CDT - Filed by Patient   What do you need help with? Medication Request   Do you agree to participate in an E-Visit? Yes   If you have any of the following symptoms, please present to your local emergency room or call 911:  I acknowledge   Medication requests for narcotics will not be addressed via an E-Visit.  Please schedule an appointment. I acknowledge   Do you want to address a new or existing medication? I would like to address a medication I currently take   What is the main issue you would like addressed today? Refill Trelegy Rx   Would you like to change or continue your medication? Continue medication   What medication would you like to continue?  Trelegy   Are you taking it as prescribed? Yes    What medical condition is the  medication intended to treat? Asthma   Is the medication helping your condition? Yes   Are you having any side effects from the medication? No   Provide any additional information you feel is important.    Please attach any relevant images or files    Are you able to take your vital signs? No         Encounter Diagnosis   Name Primary?    Mild intermittent asthma without complication         No orders of the defined types were placed in this encounter.     Medications Ordered This Encounter   Medications    fluticasone-umeclidin-vilanter (TRELEGY ELLIPTA) 200-62.5-25 mcg inhaler     Sig: Inhale 1 puff into the lungs once daily.     Dispense:  60 each     Refill:  4    -needs to establish with a PCP. Needs annual exam once a  year.       Follow up if symptoms worsen or fail to improve.      E-Visit Time Trackin minutes

## 2025-04-23 ENCOUNTER — APPOINTMENT (OUTPATIENT)
Dept: LAB | Facility: HOSPITAL | Age: 25
End: 2025-04-23
Attending: NURSE PRACTITIONER
Payer: COMMERCIAL

## 2025-04-23 ENCOUNTER — OFFICE VISIT (OUTPATIENT)
Dept: INTERNAL MEDICINE | Facility: CLINIC | Age: 25
End: 2025-04-23
Payer: COMMERCIAL

## 2025-04-23 VITALS
SYSTOLIC BLOOD PRESSURE: 118 MMHG | BODY MASS INDEX: 23.36 KG/M2 | DIASTOLIC BLOOD PRESSURE: 86 MMHG | TEMPERATURE: 97 F | OXYGEN SATURATION: 96 % | HEIGHT: 68 IN | WEIGHT: 154.13 LBS | HEART RATE: 112 BPM

## 2025-04-23 DIAGNOSIS — Z23 NEED FOR HEPATITIS B VACCINATION: ICD-10-CM

## 2025-04-23 DIAGNOSIS — Z87.2 HISTORY OF URTICARIA: ICD-10-CM

## 2025-04-23 DIAGNOSIS — Z11.3 SCREENING EXAMINATION FOR STI: ICD-10-CM

## 2025-04-23 DIAGNOSIS — K14.0 TRANSIENT LINGUAL PAPILLITIS: Primary | ICD-10-CM

## 2025-04-23 PROCEDURE — 3074F SYST BP LT 130 MM HG: CPT | Mod: CPTII,S$GLB,, | Performed by: NURSE PRACTITIONER

## 2025-04-23 PROCEDURE — 3079F DIAST BP 80-89 MM HG: CPT | Mod: CPTII,S$GLB,, | Performed by: NURSE PRACTITIONER

## 2025-04-23 PROCEDURE — 90739 HEPB VACC 2/4 DOSE ADULT IM: CPT | Mod: S$GLB,,, | Performed by: NURSE PRACTITIONER

## 2025-04-23 PROCEDURE — 99999 PR PBB SHADOW E&M-EST. PATIENT-LVL V: CPT | Mod: PBBFAC,,, | Performed by: NURSE PRACTITIONER

## 2025-04-23 PROCEDURE — 90471 IMMUNIZATION ADMIN: CPT | Mod: S$GLB,,, | Performed by: NURSE PRACTITIONER

## 2025-04-23 PROCEDURE — 3008F BODY MASS INDEX DOCD: CPT | Mod: CPTII,S$GLB,, | Performed by: NURSE PRACTITIONER

## 2025-04-23 PROCEDURE — 99385 PREV VISIT NEW AGE 18-39: CPT | Mod: 25,S$GLB,, | Performed by: NURSE PRACTITIONER

## 2025-04-23 RX ORDER — LORATADINE 10 MG/1
TABLET ORAL
COMMUNITY
Start: 2025-04-05

## 2025-04-23 RX ORDER — AZELASTINE 1 MG/ML
SPRAY, METERED NASAL
COMMUNITY
Start: 2025-04-05

## 2025-04-30 ENCOUNTER — LAB VISIT (OUTPATIENT)
Dept: LAB | Facility: HOSPITAL | Age: 25
End: 2025-04-30
Attending: ALLERGY & IMMUNOLOGY
Payer: COMMERCIAL

## 2025-04-30 ENCOUNTER — OFFICE VISIT (OUTPATIENT)
Dept: ALLERGY | Facility: CLINIC | Age: 25
End: 2025-04-30
Payer: COMMERCIAL

## 2025-04-30 VITALS
TEMPERATURE: 98 F | SYSTOLIC BLOOD PRESSURE: 109 MMHG | DIASTOLIC BLOOD PRESSURE: 64 MMHG | HEIGHT: 68 IN | WEIGHT: 155.19 LBS | BODY MASS INDEX: 23.52 KG/M2 | HEART RATE: 68 BPM

## 2025-04-30 DIAGNOSIS — Z91.018 SOY ALLERGY: ICD-10-CM

## 2025-04-30 DIAGNOSIS — Z91.013 FISH ALLERGY: Primary | ICD-10-CM

## 2025-04-30 DIAGNOSIS — Z91.018 TREE NUT ALLERGY: ICD-10-CM

## 2025-04-30 DIAGNOSIS — Z87.2 HISTORY OF URTICARIA: ICD-10-CM

## 2025-04-30 DIAGNOSIS — Z91.013 FISH ALLERGY: ICD-10-CM

## 2025-04-30 DIAGNOSIS — J31.0 RHINITIS, UNSPECIFIED TYPE: ICD-10-CM

## 2025-04-30 DIAGNOSIS — Z91.010 PEANUT ALLERGY: ICD-10-CM

## 2025-04-30 DIAGNOSIS — Z91.011 MILK ALLERGY: ICD-10-CM

## 2025-04-30 DIAGNOSIS — Z91.018 MULTIPLE FOOD ALLERGIES: ICD-10-CM

## 2025-04-30 DIAGNOSIS — Z11.3 SCREENING EXAMINATION FOR STI: ICD-10-CM

## 2025-04-30 LAB
ABSOLUTE EOSINOPHIL (OHS): 0.19 K/UL
ABSOLUTE MONOCYTE (OHS): 0.6 K/UL (ref 0.3–1)
ABSOLUTE NEUTROPHIL COUNT (OHS): 4.52 K/UL (ref 1.8–7.7)
BASOPHILS # BLD AUTO: 0.07 K/UL
BASOPHILS NFR BLD AUTO: 0.8 %
ERYTHROCYTE [DISTWIDTH] IN BLOOD BY AUTOMATED COUNT: 11.9 % (ref 11.5–14.5)
HBV CORE AB SERPL QL IA: NORMAL
HBV CORE IGM SERPL QL IA: NORMAL
HCT VFR BLD AUTO: 51.9 % (ref 40–54)
HCV AB SERPL QL IA: NORMAL
HGB BLD-MCNC: 17.3 GM/DL (ref 14–18)
HIV 1+2 AB+HIV1 P24 AG SERPL QL IA: NORMAL
IMM GRANULOCYTES # BLD AUTO: 0.03 K/UL (ref 0–0.04)
IMM GRANULOCYTES NFR BLD AUTO: 0.3 % (ref 0–0.5)
LYMPHOCYTES # BLD AUTO: 3.43 K/UL (ref 1–4.8)
MCH RBC QN AUTO: 31.3 PG (ref 27–31)
MCHC RBC AUTO-ENTMCNC: 33.3 G/DL (ref 32–36)
MCV RBC AUTO: 94 FL (ref 82–98)
NUCLEATED RBC (/100WBC) (OHS): 0 /100 WBC
PLATELET # BLD AUTO: 254 K/UL (ref 150–450)
PMV BLD AUTO: 11.1 FL (ref 9.2–12.9)
RBC # BLD AUTO: 5.52 M/UL (ref 4.6–6.2)
RELATIVE EOSINOPHIL (OHS): 2.1 %
RELATIVE LYMPHOCYTE (OHS): 38.8 % (ref 18–48)
RELATIVE MONOCYTE (OHS): 6.8 % (ref 4–15)
RELATIVE NEUTROPHIL (OHS): 51.2 % (ref 38–73)
WBC # BLD AUTO: 8.84 K/UL (ref 3.9–12.7)

## 2025-04-30 PROCEDURE — 86003 ALLG SPEC IGE CRUDE XTRC EA: CPT | Mod: 59

## 2025-04-30 PROCEDURE — 36415 COLL VENOUS BLD VENIPUNCTURE: CPT

## 2025-04-30 PROCEDURE — 86003 ALLG SPEC IGE CRUDE XTRC EA: CPT

## 2025-04-30 PROCEDURE — 86803 HEPATITIS C AB TEST: CPT

## 2025-04-30 PROCEDURE — 86008 ALLG SPEC IGE RECOMB EA: CPT

## 2025-04-30 PROCEDURE — 3078F DIAST BP <80 MM HG: CPT | Mod: CPTII,S$GLB,, | Performed by: ALLERGY & IMMUNOLOGY

## 2025-04-30 PROCEDURE — 85025 COMPLETE CBC W/AUTO DIFF WBC: CPT

## 2025-04-30 PROCEDURE — 1159F MED LIST DOCD IN RCRD: CPT | Mod: CPTII,S$GLB,, | Performed by: ALLERGY & IMMUNOLOGY

## 2025-04-30 PROCEDURE — 88184 FLOWCYTOMETRY/ TC 1 MARKER: CPT

## 2025-04-30 PROCEDURE — 3008F BODY MASS INDEX DOCD: CPT | Mod: CPTII,S$GLB,, | Performed by: ALLERGY & IMMUNOLOGY

## 2025-04-30 PROCEDURE — 99999 PR PBB SHADOW E&M-EST. PATIENT-LVL IV: CPT | Mod: PBBFAC,,, | Performed by: ALLERGY & IMMUNOLOGY

## 2025-04-30 PROCEDURE — 3074F SYST BP LT 130 MM HG: CPT | Mod: CPTII,S$GLB,, | Performed by: ALLERGY & IMMUNOLOGY

## 2025-04-30 PROCEDURE — G2211 COMPLEX E/M VISIT ADD ON: HCPCS | Mod: S$GLB,,, | Performed by: ALLERGY & IMMUNOLOGY

## 2025-04-30 PROCEDURE — 86800 THYROGLOBULIN ANTIBODY: CPT

## 2025-04-30 PROCEDURE — 86704 HEP B CORE ANTIBODY TOTAL: CPT

## 2025-04-30 PROCEDURE — 86705 HEP B CORE ANTIBODY IGM: CPT

## 2025-04-30 PROCEDURE — 87389 HIV-1 AG W/HIV-1&-2 AB AG IA: CPT

## 2025-04-30 PROCEDURE — 87661 TRICHOMONAS VAGINALIS AMPLIF: CPT

## 2025-04-30 PROCEDURE — 83520 IMMUNOASSAY QUANT NOS NONAB: CPT

## 2025-04-30 PROCEDURE — 99215 OFFICE O/P EST HI 40 MIN: CPT | Mod: S$GLB,,, | Performed by: ALLERGY & IMMUNOLOGY

## 2025-04-30 RX ORDER — EPINEPHRINE 0.3 MG/.3ML
1 INJECTION SUBCUTANEOUS ONCE
Qty: 2 EACH | Refills: 0 | Status: SHIPPED | OUTPATIENT
Start: 2025-04-30 | End: 2025-04-30

## 2025-04-30 NOTE — PROGRESS NOTES
Subjective:       Patient ID: Rob Villanueva is a 24 y.o. male.    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      No tongue or throat swelling        Fish- anaphylaxis  Epipen  Peanut- anaphylaxis  Soy- itchy throat  Tree nut anaphylaxis      Milk- itchy throat, avoids dairy            HPI:  10/12/2023:23 year old male   Peanut, soybean, tree nuts and fish  Avoiding  Not required epipen    Asthma-diagnosed as a child  Trelegy-last taken 1 week ago  No oral steroids  Last required albuterol-doesn't know  Exercise induced cough, wheeze  He  does take albuterol prior to exercise        No runny nose  Claritin prn    Atopic dermatitis-doing well  antecubital fossa   acqauphor  No steroid creams  Soap- Aveeno      He eats shrimp, lobster and crab without symptoms.    Past Medical History:   Diagnosis Date    Asthma     Eczema        Family History   Problem Relation Name Age of Onset    Obesity Mother      Hypertension Father Abisai     Asthma Father Abisai     Allergies Father Abisai     Hearing loss Father Abisai     Transient ischemic attack Father Abisai     Diabetes Paternal Grandmother Radha     Dementia Paternal Grandmother Radha     Cancer Paternal Grandfather Julian     Diabetes Paternal Grandfather Julian     Heart disease Paternal Grandfather Julian     Hyperlipidemia Paternal Grandfather Julian     Hypertension Paternal Grandfather Julian     Stroke Paternal Grandfather Julian     Early death Neg Hx      Eczema Neg Hx      Migraines Neg Hx      Thyroid disease Neg Hx         Current Outpatient Medications on File Prior to  Visit   Medication Sig Dispense Refill    albuterol (PROVENTIL/VENTOLIN HFA) 90 mcg/actuation inhaler Rescue 18 g 3    azelastine (ASTELIN) 137 mcg (0.1 %) nasal spray 1 puff in each nostril Nasally Once a day for 90 days      fluticasone propionate (FLONASE) 50 mcg/actuation nasal spray 1 spray by Each Nostril route once daily.      fluticasone-umeclidin-vilanter (TRELEGY ELLIPTA) 200-62.5-25 mcg inhaler Inhale 1 puff into the lungs once daily. 60 each 4    loratadine (CLARITIN) 10 mg tablet 1 tablet Orally Once a day for 10 days      metronidazole 0.75% (METROCREAM) 0.75 % Crea AAA face bid (Patient not taking: Reported on 4/30/2025) 45 g 11    montelukast (SINGULAIR) 10 mg tablet TAKE 1 TABLET(10 MG) BY MOUTH EVERY EVENING (Patient not taking: Reported on 4/30/2025) 90 tablet 1    [DISCONTINUED] EPINEPHrine (EPIPEN) 0.3 mg/0.3 mL AtIn Inject 0.3 mLs (0.3 mg total) into the muscle once. for 1 dose 2 each 0     No current facility-administered medications on file prior to visit.       Review of patient's allergies indicates:   Allergen Reactions    Fish containing products     Peanut     Soy     Tree nut        Environmental History: Pets in the home: dogs (1). He does not smoke.  Review of Systems   Constitutional:  Negative for chills and fever.   HENT:  Positive for congestion and rhinorrhea.    Eyes:  Negative for discharge and itching.   Respiratory:  Negative for cough, shortness of breath and wheezing.    Cardiovascular:  Negative for chest pain and leg swelling.   Gastrointestinal:  Negative for nausea and vomiting.   Endocrine: Negative for cold intolerance and heat intolerance.   Skin:  Positive for rash. Negative for wound.   Allergic/Immunologic: Positive for environmental allergies and food allergies.   Neurological:  Negative for facial asymmetry and speech difficulty.   Hematological:  Negative for adenopathy. Does not bruise/bleed easily.   Psychiatric/Behavioral:  Negative for behavioral problems  and suicidal ideas.         Objective:    Physical Exam  Vitals reviewed.   Constitutional:       General: He is not in acute distress.     Appearance: Normal appearance. He is not ill-appearing, toxic-appearing or diaphoretic.   HENT:      Head: Normocephalic and atraumatic.      Right Ear: Tympanic membrane, ear canal and external ear normal. There is no impacted cerumen.      Left Ear: Tympanic membrane, ear canal and external ear normal. There is no impacted cerumen.      Nose: Nose normal. No congestion or rhinorrhea.      Mouth/Throat:      Mouth: Mucous membranes are moist.      Pharynx: No oropharyngeal exudate or posterior oropharyngeal erythema.   Eyes:      General: No scleral icterus.        Right eye: No discharge.         Left eye: No discharge.      Pupils: Pupils are equal, round, and reactive to light.   Neck:      Vascular: No carotid bruit.   Cardiovascular:      Rate and Rhythm: Normal rate and regular rhythm.      Heart sounds: Normal heart sounds. No murmur heard.     No friction rub. No gallop.   Pulmonary:      Effort: Pulmonary effort is normal. No respiratory distress.      Breath sounds: Normal breath sounds. No stridor. No wheezing, rhonchi or rales.   Chest:      Chest wall: No tenderness.   Abdominal:      General: There is no distension.      Palpations: There is no mass.      Tenderness: There is no abdominal tenderness. There is no guarding or rebound.      Hernia: No hernia is present.   Musculoskeletal:         General: No swelling, tenderness, deformity or signs of injury. Normal range of motion.      Cervical back: Normal range of motion and neck supple. No rigidity or tenderness.      Right lower leg: No edema.      Left lower leg: No edema.   Lymphadenopathy:      Cervical: No cervical adenopathy.   Skin:     General: Skin is warm.      Coloration: Skin is not jaundiced or pale.      Findings: No bruising, erythema or lesion.   Neurological:      General: No focal deficit  present.      Mental Status: He is alert and oriented to person, place, and time.      Cranial Nerves: No cranial nerve deficit.      Motor: No weakness.      Gait: Gait normal.   Psychiatric:         Mood and Affect: Mood normal.         Behavior: Behavior normal.         Thought Content: Thought content normal.         Judgment: Judgment normal.         Assessment:       1. Fish allergy    2. History of urticaria    3. Peanut allergy    4. Tree nut allergy    5. Milk allergy    6. Soy allergy    7. Multiple food allergies           Plan:         Fish allergy  -     Allergen [place] IgE; Future; Expected date: 04/30/2025  -     Allergen Tuna IgE; Future; Expected date: 04/30/2025  -     Allergen Codfish IgE; Future; Expected date: 04/30/2025  -     Allergen-Catfish; Future; Expected date: 04/30/2025    History of urticaria  -     Ambulatory referral/consult to Allergy  -     [place] Allergen-Latex; Future; Expected date: 04/30/2025  -     CU (Chronic Urticaria) Index Panel; Future; Expected date: 04/30/2025  -     Anti-IgE Receptor Antibody; Future; Expected date: 04/30/2025  -     Tryptase; Future; Expected date: 04/30/2025  -     CBC Auto Differential; Future; Expected date: 04/30/2025    Peanut allergy  -     Allergen, Peanut Components IGE; Future; Expected date: 04/30/2025    Tree nut allergy  -     Allergen Almonds IgE; Future; Expected date: 04/30/2025  -     Allergen Pecan Nut IgE; Future; Expected date: 04/30/2025  -     Allergen Walnuts IgE; Future; Expected date: 04/30/2025  -     Allergen Pine Nut IgE; Future; Expected date: 04/30/2025  -     Cashew IgE; Future; Expected date: 04/30/2025  -     Allergen - Pistachio; Future; Expected date: 04/30/2025  -     Allergen, Macadamia Nut; Future; Expected date: 04/30/2025  -     Allergen Brazil Nut IgE; Future; Expected date: 04/30/2025    Milk allergy  -     Allergen Milk IgE; Future; Expected date: 04/30/2025    Soy allergy  -     Allergen Soybean IgE; Future;  Expected date: 04/30/2025    Multiple food allergies  -     EPINEPHrine (EPIPEN) 0.3 mg/0.3 mL AtIn; Inject 0.3 mLs (0.3 mg total) into the muscle once. for 1 dose  Dispense: 2 each; Refill: 0      Labs ordered.  He requested HIV And Hep b testing.  Discussed Xolair    Recommend avoiding latex.  Consider patch testing if the aforementioned does not lead to an etiology.    Epipen 2 pack  Recommend avoiding all foods that have caused symptoms and of which he is allergic  Recommend taking Xyzal/Zyrtec daily      Visit today included increased complexity associated with the care of the episodic problem  Tree nut allergy, mild allergy, Urticaria addressed and managing the longitudinal care of the patient due to the serious and/or complex managed problem(s)   Asthma, Allergic rhinitis, GERD and Food Allergy.       RTC 4-6 weeks or sooner, if needed    ALEX DELVALLE spent a total of 47 minutes on the day of the visit.This includes face to face time and non-face to face time preparing to see the patient (eg, review of tests), obtaining and/or reviewing separately obtained history, documenting clinical information in the electronic or other health record, independently interpreting results and communicating results to the patient/family/caregiver, or care coordinator.       CC: Mp Smith NP

## 2025-05-01 ENCOUNTER — RESULTS FOLLOW-UP (OUTPATIENT)
Dept: ALLERGY | Facility: CLINIC | Age: 25
End: 2025-05-01

## 2025-05-01 LAB
SPECIMEN SOURCE: NORMAL
T VAGINALIS RRNA SPEC QL NAA+PROBE: NEGATIVE

## 2025-05-02 LAB — TRYPTASE SERPL-MCNC: 4.2 NG/ML

## 2025-05-05 LAB
W ALLERGY INTERPRETATION: ABNORMAL
W ALLERGY INTERPRETATION: ABNORMAL
W ALMOND, CLASS: ABNORMAL
W ALMOND, IGE: 0.13 KU/L
W ALTERNARIA ALTERNATA, CLASS: ABNORMAL
W ALTERNARIA ALTERNATA, IGE: <0.1 KU/L
W ARA H 1 (F422): 20.3 KU/L
W ARA H 1 CLASS: ABNORMAL
W ARA H 2 (F423): 19.8 KU/L
W ARA H 2 CLASS: ABNORMAL
W ARA H 3 (F424): 2.76 KU/L
W ARA H 3 CLASS: ABNORMAL
W ARA H 6 (F447): 21.5 KU/L
W ARA H 6 CLASS: ABNORMAL
W ARA H 8 (F352): <0.1 KU/L
W ARA H 8 CLASS: ABNORMAL
W ARA H 9 (F427): <0.1 KU/L
W ARA H 9 CLASS: ABNORMAL
W ASPERGILLUS FUMIGATUS, CLASS: ABNORMAL
W ASPERGILLUS FUMIGATUS, IGE: <0.1 KU/L
W BERMUDA GRASS, CLASS: ABNORMAL
W BERMUDA GRASS, IGE: <0.1 KU/L
W BRAZIL NUT , CLASS: NORMAL
W BRAZIL NUT , IGE: <0.1 KU/L
W CASHEW NUT , CLASS: NORMAL
W CASHEW NUT , IGE: <0.1 KU/L
W CAT DANDER, CLASS: ABNORMAL
W CAT DANDER, IGE: <0.1 KU/L
W CAT FISH , CLASS: ABNORMAL
W CAT FISH , IGE: 0.55 KU/L
W CLADOSPORIUM HERBARUM, CLASS: ABNORMAL
W CLADOSPORIUM HERBARUM, IGE: <0.1 KU/L
W COCKROACH, GERMAN, CLASS: ABNORMAL
W COCKROACH, GERMAN, IGE: <0.1 KU/L
W COD , CLASS: NORMAL
W COD , IGE: <0.1 KU/L
W COMMON PIGWEED, CLASS: ABNORMAL
W COMMON PIGWEED, IGE: <0.1 KU/L
W COMMON RAGWEED (SHORT), CLASS: ABNORMAL
W COMMON RAGWEED (SHORT), IGE: <0.1 KU/L
W COMMON SILVER BIRCH, CLASS: ABNORMAL
W COMMON SILVER BIRCH, IGE: <0.1 KU/L
W COW'S MILK, CLASS: ABNORMAL
W COW'S MILK, IGE: 0.11 KU/L
W DERMATOPHAGOIDES FARINAE CLASS: ABNORMAL
W DERMATOPHAGOIDES FARINAE, IGE: 17.5 KU/L
W DERMATOPHAGOIDES PTERONYSSINUS CLASS: ABNORMAL
W DERMATOPHAGOIDES PTERONYSSINUS, IGE: 1.52 KU/L
W DOG DANDER, CLASS: ABNORMAL
W DOG DANDER, IGE: 0.26 KU/L
W ELM, CLASS: ABNORMAL
W ELM, IGE: <0.1 KU/L
W IGE: 185 IU/ML
W LATEX , CLASS: NORMAL
W LATEX , IGE: <0.1 KU/L
W MACADAMIA NUT , CLASS: NORMAL
W MACADAMIA NUT , IGE: <0.1 KU/L
W MAPLE (BOX ELDER), CLASS: ABNORMAL
W MAPLE (BOX ELDER), IGE: <0.1 KU/L
W MOUNTAIN JUNIPER CLASS: ABNORMAL
W MOUNTAIN JUNIPER, IGE: <0.1 KU/L
W MOUSE URINE PROTEINS CLASS: ABNORMAL
W MOUSE URINE PROTEINS, IGE: 0.82 KU/L
W MULBERRY, CLASS: ABNORMAL
W MULBERRY, IGE: <0.1 KU/L
W OAK, CLASS: ABNORMAL
W OAK, IGE: <0.1 KU/L
W PECAN NUT, CLASS: NORMAL
W PECAN NUT, IGE: <0.1 KU/L
W PECAN, HICKORY, CLASS: ABNORMAL
W PECAN, HICKORY, IGE: <0.1 KU/L
W PENICILLIUM CHRYSOGENUM, CLASS: ABNORMAL
W PENICILLIUM CHRYSOGENUM, IGE: <0.1 KU/L
W PISTACHIO , CLASS: ABNORMAL
W PISTACHIO , IGE: 0.11 KU/L
W ROUGH MARSHELDER, CLASS: ABNORMAL
W ROUGH MARSHELDER, IGE: <0.1 KU/L
W SALMON , CLASS: NORMAL
W SALMON , IGE: <0.1 KU/L
W SOYBEAN, CLASS: ABNORMAL
W SOYBEAN, IGE: 0.77 KU/L
W TIMOTHY GRASS, CLASS: ABNORMAL
W TIMOTHY GRASS, IGE: <0.1 KU/L
W TUNA , CLASS: NORMAL
W TUNA , IGE: 0.1 KU/L
W WALNUT , CLASS: NORMAL
W WALNUT , IGE: <0.1 KU/L
W WALNUT TREE, CLASS: ABNORMAL
W WALNUT TREE, IGE: <0.1 KU/L

## 2025-05-09 LAB
M CU INDEX: 8.2
THYROGLOB IGG SER-ACNC: <20 IU/ML
THYROPEROXIDASE IGG SERPL-ACNC: <10 IU/ML
TSH SERPL-ACNC: 1.52 UIU/ML (ref 0.4–4)